# Patient Record
Sex: FEMALE | Race: OTHER | NOT HISPANIC OR LATINO | URBAN - METROPOLITAN AREA
[De-identification: names, ages, dates, MRNs, and addresses within clinical notes are randomized per-mention and may not be internally consistent; named-entity substitution may affect disease eponyms.]

---
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
## 2023-03-12 ENCOUNTER — INPATIENT (INPATIENT)
Facility: HOSPITAL | Age: 18
LOS: 3 days | Discharge: HOME | DRG: 27 | End: 2023-03-16
Attending: STUDENT IN AN ORGANIZED HEALTH CARE EDUCATION/TRAINING PROGRAM | Admitting: STUDENT IN AN ORGANIZED HEALTH CARE EDUCATION/TRAINING PROGRAM
Payer: COMMERCIAL

## 2023-03-12 VITALS
RESPIRATION RATE: 18 BRPM | HEART RATE: 104 BPM | DIASTOLIC BLOOD PRESSURE: 81 MMHG | TEMPERATURE: 99 F | OXYGEN SATURATION: 98 % | SYSTOLIC BLOOD PRESSURE: 127 MMHG

## 2023-03-12 LAB
A1C WITH ESTIMATED AVERAGE GLUCOSE RESULT: 5.4 % — SIGNIFICANT CHANGE UP (ref 4–5.6)
ALBUMIN SERPL ELPH-MCNC: 3.8 G/DL — SIGNIFICANT CHANGE UP (ref 3.3–5)
ALP SERPL-CCNC: 82 U/L — SIGNIFICANT CHANGE UP (ref 40–120)
ALT FLD-CCNC: 9 U/L — LOW (ref 10–45)
ANION GAP SERPL CALC-SCNC: 10 MMOL/L — SIGNIFICANT CHANGE UP (ref 5–17)
AST SERPL-CCNC: 17 U/L — SIGNIFICANT CHANGE UP (ref 10–40)
BILIRUB SERPL-MCNC: 0.6 MG/DL — SIGNIFICANT CHANGE UP (ref 0.2–1.2)
BUN SERPL-MCNC: 10 MG/DL — SIGNIFICANT CHANGE UP (ref 7–23)
CALCIUM SERPL-MCNC: 9.4 MG/DL — SIGNIFICANT CHANGE UP (ref 8.4–10.5)
CHLORIDE SERPL-SCNC: 106 MMOL/L — SIGNIFICANT CHANGE UP (ref 96–108)
CO2 SERPL-SCNC: 24 MMOL/L — SIGNIFICANT CHANGE UP (ref 22–31)
CREAT SERPL-MCNC: 0.7 MG/DL — SIGNIFICANT CHANGE UP (ref 0.5–1.3)
EGFR: 128 ML/MIN/1.73M2 — SIGNIFICANT CHANGE UP
ESTIMATED AVERAGE GLUCOSE: 108 MG/DL — SIGNIFICANT CHANGE UP (ref 68–114)
GLUCOSE SERPL-MCNC: 103 MG/DL — HIGH (ref 70–99)
HCG SERPL-ACNC: <0 MIU/ML — SIGNIFICANT CHANGE UP
HCT VFR BLD CALC: 31.2 % — LOW (ref 34.5–45)
HGB BLD-MCNC: 9.5 G/DL — LOW (ref 11.5–15.5)
MAGNESIUM SERPL-MCNC: 1.8 MG/DL — SIGNIFICANT CHANGE UP (ref 1.6–2.6)
MCHC RBC-ENTMCNC: 23.2 PG — LOW (ref 27–34)
MCHC RBC-ENTMCNC: 30.4 GM/DL — LOW (ref 32–36)
MCV RBC AUTO: 76.3 FL — LOW (ref 80–100)
NRBC # BLD: 0 /100 WBCS — SIGNIFICANT CHANGE UP (ref 0–0)
PHOSPHATE SERPL-MCNC: 4 MG/DL — SIGNIFICANT CHANGE UP (ref 2.5–4.5)
PLATELET # BLD AUTO: 356 K/UL — SIGNIFICANT CHANGE UP (ref 150–400)
POTASSIUM SERPL-MCNC: 4 MMOL/L — SIGNIFICANT CHANGE UP (ref 3.5–5.3)
POTASSIUM SERPL-SCNC: 4 MMOL/L — SIGNIFICANT CHANGE UP (ref 3.5–5.3)
PROT SERPL-MCNC: 7.1 G/DL — SIGNIFICANT CHANGE UP (ref 6–8.3)
RBC # BLD: 4.09 M/UL — SIGNIFICANT CHANGE UP (ref 3.8–5.2)
RBC # FLD: 15 % — HIGH (ref 10.3–14.5)
SARS-COV-2 RNA SPEC QL NAA+PROBE: SIGNIFICANT CHANGE UP
SODIUM SERPL-SCNC: 140 MMOL/L — SIGNIFICANT CHANGE UP (ref 135–145)
WBC # BLD: 6.3 K/UL — SIGNIFICANT CHANGE UP (ref 3.8–10.5)
WBC # FLD AUTO: 6.3 K/UL — SIGNIFICANT CHANGE UP (ref 3.8–10.5)

## 2023-03-12 PROCEDURE — 99291 CRITICAL CARE FIRST HOUR: CPT

## 2023-03-12 RX ORDER — INSULIN LISPRO 100/ML
VIAL (ML) SUBCUTANEOUS
Refills: 0 | Status: DISCONTINUED | OUTPATIENT
Start: 2023-03-12 | End: 2023-03-13

## 2023-03-12 RX ORDER — GLUCAGON INJECTION, SOLUTION 0.5 MG/.1ML
1 INJECTION, SOLUTION SUBCUTANEOUS ONCE
Refills: 0 | Status: DISCONTINUED | OUTPATIENT
Start: 2023-03-12 | End: 2023-03-13

## 2023-03-12 RX ORDER — SODIUM CHLORIDE 9 MG/ML
1000 INJECTION, SOLUTION INTRAVENOUS
Refills: 0 | Status: DISCONTINUED | OUTPATIENT
Start: 2023-03-12 | End: 2023-03-13

## 2023-03-12 RX ORDER — ALBUTEROL 90 UG/1
2 AEROSOL, METERED ORAL DAILY
Refills: 0 | Status: DISCONTINUED | OUTPATIENT
Start: 2023-03-12 | End: 2023-03-13

## 2023-03-12 RX ORDER — ACETAMINOPHEN 500 MG
650 TABLET ORAL EVERY 6 HOURS
Refills: 0 | Status: DISCONTINUED | OUTPATIENT
Start: 2023-03-12 | End: 2023-03-15

## 2023-03-12 RX ORDER — DEXTROSE 50 % IN WATER 50 %
25 SYRINGE (ML) INTRAVENOUS ONCE
Refills: 0 | Status: DISCONTINUED | OUTPATIENT
Start: 2023-03-12 | End: 2023-03-13

## 2023-03-12 RX ORDER — TOPIRAMATE 25 MG
50 TABLET ORAL DAILY
Refills: 0 | Status: DISCONTINUED | OUTPATIENT
Start: 2023-03-12 | End: 2023-03-16

## 2023-03-12 RX ORDER — SENNA PLUS 8.6 MG/1
2 TABLET ORAL AT BEDTIME
Refills: 0 | Status: DISCONTINUED | OUTPATIENT
Start: 2023-03-12 | End: 2023-03-14

## 2023-03-12 RX ORDER — DEXTROSE 50 % IN WATER 50 %
12.5 SYRINGE (ML) INTRAVENOUS ONCE
Refills: 0 | Status: DISCONTINUED | OUTPATIENT
Start: 2023-03-12 | End: 2023-03-13

## 2023-03-12 RX ORDER — DEXTROSE 50 % IN WATER 50 %
15 SYRINGE (ML) INTRAVENOUS ONCE
Refills: 0 | Status: DISCONTINUED | OUTPATIENT
Start: 2023-03-12 | End: 2023-03-13

## 2023-03-12 RX ADMIN — SENNA PLUS 2 TABLET(S): 8.6 TABLET ORAL at 22:18

## 2023-03-12 NOTE — PROGRESS NOTE ADULT - SUBJECTIVE AND OBJECTIVE BOX
HPI:    SURGERY:       EVENTS:         ICU Vital Signs Last 24 Hrs  T(C): --  T(F): --  HR: --  BP: --  BP(mean): --  ABP: --  ABP(mean): --  RR: --  SpO2: --                       PHYSICAL EXAM:    General: No Acute Distress     Neurological: Awake, alert oriented to person, place and time, Following Commands, PERRL, EOMI, Face Symmetrical, Speech Fluent, Moving all extremities, Muscle Strength normal in all four extremities, No Drift, Sensation to Light Touch Intact    Pulmonary: Clear to Auscultation, No Rales, No Rhonchi, No Wheezes     Cardiovascular: S1, S2, Regular Rate and Rhythm     Gastrointestinal: Soft, Nontender, Nondistended     Extremities: No calf tenderness     Incision:       MEDICATIONS:  Antibiotics:      Neurological:     Cardiac:     Pulm:    Heme:     Other:        DEVICES: [] Restraints [] MEERA/HMV []LD [] ET tube [] Trach [] Chest Tube [] A-line [] Ji [] NGT [] Rectal Tube       A/P:  hydrocephalus   Neuro: MRI brain wwo contrast  hydro watch   Respiratory: RA   CV:SBP goal 100-160 mmhg   Endocrine: sugar 120-180   DVT ppx: hold chemorphylaxis for possible EVD    Renal: NS 75 ml/hr   ID:   GI:   Social/Family:   Discharge planning:     Code Status: [] Full Code [] DNR [] DNI [] Goals of Care:   Disposition: [] ICU [] Stroke Unit [] RCU []PCU []Floor [] Discharge Home     Patient at high risk for neurologic deterioration, critical care time, excluding procedures: 45 minutes                    HPI:  16 yo woman with PMH of migraine is presenting for episode of syncope, and CT head showing hydrocephalus.   She has had multiple episodes of syncope within the last few years, had MRI brain 3 years ago which showed hydrocepahlus, she was advised to see a NS but did not. Per patient  She had EEG monitoring which did not show seizures and she saw a cardiologist who told her that there is no problem with her heart.  She doesnt remember her syncope episodes and she denies urinating on herself or biting her tongue The syncope has been unwitnessed, not sure how long it lasts. denies being post-ictal afterwards        T(C): 37.1 (03-12-23 @ 20:06), Max: 37.1 (03-12-23 @ 20:06)  HR: 88 (03-12-23 @ 21:10) (88 - 104)  BP: 108/66 (03-12-23 @ 21:10) (108/66 - 127/81)  RR: 17 (03-12-23 @ 21:10) (17 - 18)  SpO2: 97% (03-12-23 @ 21:10) (97% - 98%)  03-12-23 @ 07:01  -  03-12-23 @ 21:27  --------------------------------------------------------  IN: 0 mL / OUT: 200 mL / NET: -200 mL    acetaminophen     Tablet .. 650 milliGRAM(s) Oral every 6 hours PRN  albuterol    90 MICROgram(s) HFA Inhaler 2 Puff(s) Inhalation daily  senna 2 Tablet(s) Oral at bedtime  topiramate 50 milliGRAM(s) Oral daily                PHYSICAL EXAM:    General: No Acute Distress     Neurological: Awake, alert oriented to person, place and time, Following Commands, PERRL, EOMI, Face Symmetrical, Speech Fluent, Moving all extremities, Muscle Strength normal in all four extremities, No Drift, Sensation to Light Touch Intact    Pulmonary: Clear to Auscultation, No Rales, No Rhonchi, No Wheezes     Cardiovascular: S1, S2, Regular Rate and Rhythm     Gastrointestinal: Soft, Nontender, Nondistended     Extremities: No calf tenderness     Incision:       MEDICATIONS:  Antibiotics:      Neurological:     Cardiac:     Pulm:    Heme:     Other:        DEVICES: [] Restraints [] MEERA/HMV []LD [] ET tube [] Trach [] Chest Tube [] A-line [] Ji [] NGT [] Rectal Tube       A/P:  hydrocephalus   Neuro: MRI brain wwo contrast  symptomatic hydro watch might need an EVD   migraine continue home topamax   denies neck pain, and no tenderness on exam   Respiratory: RA   CV:SBP goal 100-160 mmhg   Endocrine: sugar 120-180   DVT ppx: hold chemorphylaxis for possible EVD    Renal: IVL   ID: afebrile   GI: regular diet   Social/Family: updated at bedside   Discharge planning:     Code Status: [x] Full Code [] DNR [] DNI [] Goals of Care:   Disposition: [x] ICU [] Stroke Unit [] RCU []PCU []Floor [] Discharge Home     Patient at high risk for neurologic deterioration, critical care time, excluding procedures: 30 minutes                    no

## 2023-03-13 ENCOUNTER — TRANSCRIPTION ENCOUNTER (OUTPATIENT)
Age: 18
End: 2023-03-13

## 2023-03-13 DIAGNOSIS — G91.1 OBSTRUCTIVE HYDROCEPHALUS: ICD-10-CM

## 2023-03-13 LAB
ANION GAP SERPL CALC-SCNC: 11 MMOL/L — SIGNIFICANT CHANGE UP (ref 5–17)
BUN SERPL-MCNC: 12 MG/DL — SIGNIFICANT CHANGE UP (ref 7–23)
CALCIUM SERPL-MCNC: 9.6 MG/DL — SIGNIFICANT CHANGE UP (ref 8.4–10.5)
CHLORIDE SERPL-SCNC: 104 MMOL/L — SIGNIFICANT CHANGE UP (ref 96–108)
CO2 SERPL-SCNC: 25 MMOL/L — SIGNIFICANT CHANGE UP (ref 22–31)
CREAT SERPL-MCNC: 0.77 MG/DL — SIGNIFICANT CHANGE UP (ref 0.5–1.3)
EGFR: 114 ML/MIN/1.73M2 — SIGNIFICANT CHANGE UP
GLUCOSE BLDC GLUCOMTR-MCNC: 99 MG/DL — SIGNIFICANT CHANGE UP (ref 70–99)
GLUCOSE SERPL-MCNC: 101 MG/DL — HIGH (ref 70–99)
HCT VFR BLD CALC: 33.9 % — LOW (ref 34.5–45)
HGB BLD-MCNC: 10.3 G/DL — LOW (ref 11.5–15.5)
MAGNESIUM SERPL-MCNC: 1.9 MG/DL — SIGNIFICANT CHANGE UP (ref 1.6–2.6)
MCHC RBC-ENTMCNC: 23 PG — LOW (ref 27–34)
MCHC RBC-ENTMCNC: 30.4 GM/DL — LOW (ref 32–36)
MCV RBC AUTO: 75.7 FL — LOW (ref 80–100)
NRBC # BLD: 0 /100 WBCS — SIGNIFICANT CHANGE UP (ref 0–0)
PHOSPHATE SERPL-MCNC: 4.5 MG/DL — SIGNIFICANT CHANGE UP (ref 2.5–4.5)
PLATELET # BLD AUTO: 357 K/UL — SIGNIFICANT CHANGE UP (ref 150–400)
POTASSIUM SERPL-MCNC: 4.3 MMOL/L — SIGNIFICANT CHANGE UP (ref 3.5–5.3)
POTASSIUM SERPL-SCNC: 4.3 MMOL/L — SIGNIFICANT CHANGE UP (ref 3.5–5.3)
RBC # BLD: 4.48 M/UL — SIGNIFICANT CHANGE UP (ref 3.8–5.2)
RBC # FLD: 15.2 % — HIGH (ref 10.3–14.5)
SODIUM SERPL-SCNC: 140 MMOL/L — SIGNIFICANT CHANGE UP (ref 135–145)
WBC # BLD: 4.86 K/UL — SIGNIFICANT CHANGE UP (ref 3.8–10.5)
WBC # FLD AUTO: 4.86 K/UL — SIGNIFICANT CHANGE UP (ref 3.8–10.5)

## 2023-03-13 PROCEDURE — 70544 MR ANGIOGRAPHY HEAD W/O DYE: CPT | Mod: 26,59

## 2023-03-13 PROCEDURE — 70553 MRI BRAIN STEM W/O & W/DYE: CPT | Mod: 26

## 2023-03-13 PROCEDURE — 99291 CRITICAL CARE FIRST HOUR: CPT

## 2023-03-13 PROCEDURE — 99221 1ST HOSP IP/OBS SF/LOW 40: CPT

## 2023-03-13 RX ORDER — ENOXAPARIN SODIUM 100 MG/ML
40 INJECTION SUBCUTANEOUS ONCE
Refills: 0 | Status: COMPLETED | OUTPATIENT
Start: 2023-03-13 | End: 2023-03-13

## 2023-03-13 RX ORDER — ONDANSETRON 8 MG/1
4 TABLET, FILM COATED ORAL ONCE
Refills: 0 | Status: COMPLETED | OUTPATIENT
Start: 2023-03-13 | End: 2023-03-13

## 2023-03-13 RX ORDER — INFLUENZA VIRUS VACCINE 15; 15; 15; 15 UG/.5ML; UG/.5ML; UG/.5ML; UG/.5ML
0.5 SUSPENSION INTRAMUSCULAR ONCE
Refills: 0 | Status: DISCONTINUED | OUTPATIENT
Start: 2023-03-13 | End: 2023-03-16

## 2023-03-13 RX ORDER — ALBUTEROL 90 UG/1
3 AEROSOL, METERED ORAL
Qty: 0 | Refills: 0 | DISCHARGE

## 2023-03-13 RX ORDER — TOPIRAMATE 25 MG
1 TABLET ORAL
Qty: 0 | Refills: 0 | DISCHARGE

## 2023-03-13 RX ORDER — ALBUTEROL 90 UG/1
2 AEROSOL, METERED ORAL DAILY
Refills: 0 | Status: DISCONTINUED | OUTPATIENT
Start: 2023-03-13 | End: 2023-03-16

## 2023-03-13 RX ADMIN — ENOXAPARIN SODIUM 40 MILLIGRAM(S): 100 INJECTION SUBCUTANEOUS at 11:14

## 2023-03-13 RX ADMIN — Medication 2 MILLIGRAM(S): at 12:15

## 2023-03-13 RX ADMIN — Medication 50 MILLIGRAM(S): at 11:14

## 2023-03-13 RX ADMIN — ONDANSETRON 4 MILLIGRAM(S): 8 TABLET, FILM COATED ORAL at 14:15

## 2023-03-13 RX ADMIN — SENNA PLUS 2 TABLET(S): 8.6 TABLET ORAL at 21:44

## 2023-03-13 NOTE — DIETITIAN INITIAL EVALUATION ADULT - PERTINENT LABORATORY DATA
03-13    140  |  104  |  12  ----------------------------<  101<H>  4.3   |  25  |  0.77    Ca    9.6      13 Mar 2023 05:30  Phos  4.5     03-13  Mg     1.9     03-13    TPro  7.1  /  Alb  3.8  /  TBili  0.6  /  DBili  x   /  AST  17  /  ALT  9<L>  /  AlkPhos  82  03-12  POCT Blood Glucose.: 99 mg/dL (03-13-23 @ 06:34)  A1C with Estimated Average Glucose Result: 5.4 % (03-12-23 @ 21:40)

## 2023-03-13 NOTE — H&P ADULT - ASSESSMENT
17F hx of migraines, asthma, several years of syncopal episodes presented to [facility] on 3/12/23 for a syncopal event. Patient states she was in her kitchen getting ready for work and suddenly felt lightheaded and had cold sweats. She states it was an unwitnessed fall and landed on her face chipping her front tooth. She was brought to [place] and underwent CTH showing moderate obstructive hydrocephalus. She states she had a similar syncopal episode most recently last year and another 3 years ago. She states she was told to see a neurosurgeon for "increased fluid" after an outpatient MRI brain was done but did not follow-up, at this time she also was seen by a cardiologist for syncope work-up that was reportedly negative. She denies any symptoms currently and is accompanied by her father.

## 2023-03-13 NOTE — H&P ADULT - HISTORY OF PRESENT ILLNESS
17F hx of migraines, asthma, several years of syncopal episodes presented to [facility] on 3/12/23 for a syncopal event. Patient states she was in her kitchen getting ready for work and suddenly felt lightheaded and had cold sweats. She states it was an unwitnessed fall and landed on her face chipping her front tooth. She was brought to [place] and underwent CTH showing moderate obstructive hydrocephalus and CT orbits/facial bones which was negative. She states she had a similar syncopal episode most recently last year and another 3 years ago. She states she was told to see a neurosurgeon for "increased fluid" after an outpatient MRI brain was done but did not follow-up, at this time she also was seen by a cardiologist for syncope work-up that was reportedly negative. She denies any symptoms currently and is accompanied by her father.

## 2023-03-13 NOTE — H&P ADULT - NSHPPHYSICALEXAM_GEN_ALL_CORE
Constitutional: NAD, well groomed, well nourished  Respiratory: breathing non-labored, symmetrical chest wall movement  Cardiovascuar: RRR, no murmurs  Gastrointestinal: abdomen soft, non tender  Genitourinary: exam deffered  Neurological:  AAOX3. Face symmetric, Verbal function intact, speech clear  Cranial Nerves: II-XII intact  Motor: 5/5 power in b/l upper extremities and lower extremities  Sensation: intact to light touch in all extremities  Pronator Drift: negative  Dysmetria: negative  Extremities: distal pulses 2+ x4

## 2023-03-13 NOTE — DIETITIAN INITIAL EVALUATION ADULT - PERTINENT MEDS FT
MEDICATIONS  (STANDING):  influenza   Vaccine 0.5 milliLiter(s) IntraMuscular once  LORazepam   Injectable 2 milliGRAM(s) IV Push once  senna 2 Tablet(s) Oral at bedtime  topiramate 50 milliGRAM(s) Oral daily    MEDICATIONS  (PRN):  acetaminophen     Tablet .. 650 milliGRAM(s) Oral every 6 hours PRN Temp greater or equal to 38C (100.4F), Mild Pain (1 - 3)  albuterol    90 MICROgram(s) HFA Inhaler 2 Puff(s) Inhalation daily PRN Shortness of Breath and/or Wheezing

## 2023-03-13 NOTE — DIETITIAN INITIAL EVALUATION ADULT - OTHER INFO
17F hx of migraines, asthma, several years of syncopal episodes presented to [facility] on 3/12/23 for a syncopal event. Patient states she was in her kitchen getting ready for work and suddenly felt lightheaded and had cold sweats. She states it was an unwitnessed fall and landed on her face chipping her front tooth. She was brought to [place] and underwent CTH showing moderate obstructive hydrocephalus. She states she had a similar syncopal episode most recently last year and another 3 years ago. She states she was told to see a neurosurgeon for "increased fluid" after an outpatient MRI brain was done but did not follow-up, at this time she also was seen by a cardiologist for syncope work-up that was reportedly negative.     On assessment, pt resting in bed. Currently on regular diet tolerating PO well. Pt consuming >50% of meals. No n/v/d/c. No abd distention or discomfort. Skin WDL, dannielle score 19. No pain noted at this time. Pt reported good appetite PTA without noted changes. Follows regular diet. No cultural, ethnic, Buddhist food preferences noted. UBW consistent with admission weight. NKFA nor intolerances noted. RD cont to encourage adequate PO intake as tolerated. RD to follow.

## 2023-03-13 NOTE — PATIENT PROFILE ADULT - FALL HARM RISK - HARM RISK INTERVENTIONS
Communicate Risk of Fall with Harm to all staff/Reinforce activity limits and safety measures with patient and family/Tailored Fall Risk Interventions/Visual Cue: Yellow wristband and red socks/Bed in lowest position, wheels locked, appropriate side rails in place/Call bell, personal items and telephone in reach/Instruct patient to call for assistance before getting out of bed or chair/Non-slip footwear when patient is out of bed/[place] to call system/Physically safe environment - no spills, clutter or unnecessary equipment/Purposeful Proactive Rounding/Room/bathroom lighting operational, light cord in reach Assistance with ambulation/Assistance OOB with selected safe patient handling equipment/Communicate Risk of Fall with Harm to all staff/Reinforce activity limits and safety measures with patient and family/Tailored Fall Risk Interventions/Visual Cue: Yellow wristband and red socks/Bed in lowest position, wheels locked, appropriate side rails in place/Call bell, personal items and telephone in reach/Instruct patient to call for assistance before getting out of bed or chair/Non-slip footwear when patient is out of bed/[place] to call system/Physically safe environment - no spills, clutter or unnecessary equipment/Purposeful Proactive Rounding/Room/bathroom lighting operational, light cord in reach

## 2023-03-13 NOTE — PROGRESS NOTE ADULT - SUBJECTIVE AND OBJECTIVE BOX
NSCU ATTENDING -- ADDITIONAL PROGRESS NOTE    Nighttime rounds were performed -- please refer to earlier Progress Note for HPI details.      ICU Vital Signs Last 24 Hrs  T(C): 37 (13 Mar 2023 17:22), Max: 37 (13 Mar 2023 17:22)  T(F): 98.6 (13 Mar 2023 17:22), Max: 98.6 (13 Mar 2023 17:22)  HR: 100 (13 Mar 2023 21:00) (68 - 114)  BP: 111/65 (13 Mar 2023 21:00) (91/56 - 119/86)  BP(mean): 82 (13 Mar 2023 21:00) (68 - 99)  RR: 16 (13 Mar 2023 21:00) (15 - 20)  SpO2: 98% (13 Mar 2023 21:00) (95% - 99%)      03-12-23 @ 07:01  -  03-13-23 @ 07:00  --------------------------------------------------------  IN: 150 mL / OUT: 475 mL / NET: -325 mL    03-13-23 @ 07:01  -  03-13-23 @ 21:33  --------------------------------------------------------  IN: 600 mL / OUT: 950 mL / NET: -350 mL      NEURO EXAM:  Neurological: Awake, alert oriented to person, place and time, Following Commands, PERRL, EOMI, Face Symmetrical, Speech Fluent,   Moving all extremities, Muscle Strength normal in all four extremities, No Drift, Sensation to Light Touch Intact      MEDICATIONS:   acetaminophen     Tablet .. 650 milliGRAM(s) Oral every 6 hours PRN  albuterol    90 MICROgram(s) HFA Inhaler 2 Puff(s) Inhalation daily PRN  influenza   Vaccine 0.5 milliLiter(s) IntraMuscular once  senna 2 Tablet(s) Oral at bedtime  topiramate 50 milliGRAM(s) Oral daily      LABS:                      10.3   4.86  )-----------( 357      ( 13 Mar 2023 05:30 )             33.9     03-13    140  |  104  |  12  ----------------------------<  101<H>  4.3   |  25  |  0.77    Ca    9.6      13 Mar 2023 05:30  Phos  4.5     03-13  Mg     1.9     03-13    TPro  7.1  /  Alb  3.8  /  TBili  0.6  /  DBili  x   /  AST  17  /  ALT  9<L>  /  AlkPhos  82  03-12    LIVER FUNCTIONS - ( 12 Mar 2023 21:40 )  Alb: 3.8 g/dL / Pro: 7.1 g/dL / ALK PHOS: 82 U/L / ALT: 9 U/L / AST: 17 U/L / GGT: x             ASSESSMENT: Hydrocephalus   Neuro: Q4 neurochecks  MRI reviewed. Aqueductal stenosis with transependymal flow  Migraine: Continue home topamax   Respiratory: Room air  CV: SBP goal 100-160 mmhg   Endocrine: sugar 120-180   DVT ppx: SCDs. Start SQL pending OR  Microcytic anemia- send anemia w/u  Renal: IVL   ID: Afebrile   GI: Regular diet

## 2023-03-13 NOTE — PROGRESS NOTE ADULT - SUBJECTIVE AND OBJECTIVE BOX
***********************************************  ADULT NSICU PROGRESS NOTE  CLAIRE RICHARDS [id number] [facility] [address]EA [id number]  ***********************************************    24H INTERVAL EVENTS:    ROS: negative except per mentioned above in 24h interval events.      HOSPITAL COURSE CARRIED FORWARD:    VITALS:    ICU Vital Signs Last 24 Hrs  T(C): 36.3 (13 Mar 2023 05:15), Max: 37.1 (12 Mar 2023 20:06)  T(F): 97.4 (13 Mar 2023 05:15), Max: 98.7 (12 Mar 2023 20:06)  HR: 73 (13 Mar 2023 07:00) (68 - 104)  BP: 91/56 (13 Mar 2023 07:00) (91/56 - 127/81)  BP(mean): 68 (13 Mar 2023 07:00) (68 - 96)  ABP: --  ABP(mean): --  RR: 15 (13 Mar 2023 07:00) (15 - 18)  SpO2: 97% (13 Mar 2023 07:00) (96% - 99%)    O2 Parameters below as of 13 Mar 2023 07:00  Patient On (Oxygen Delivery Method): room air                I&O's Summary    12 Mar 2023 07:01  -  13 Mar 2023 07:00  --------------------------------------------------------  IN: 150 mL / OUT: 475 mL / NET: -325 mL        EXAM:     [place] Coma Scale:     General: normocephalic, atraumatic, laying in bed, in no distress  Neuro     MS: A/Ox3, cooperative, normal attention, no neglect, comprehension intact, speech with preserved fluency, naming, and repetition    CN: PERRL, VF FTC, EOMI and no ptosis bilaterally, sensation intact to crude touch V1-V3, face symmetric, hearing grossly intact    Mot: bulk normal, tone normal, power 5/5 in bilateral upper and lower proximal extremities    Sens: intact to crude touch in bilateral upper and lower extremities    Reflexes: deferred    Coord: no dysmetria or ataxia on finger to nose/heel to shin, respectively, no focal bradykinetic movements    Gait: deferred  Chest: nonlabored respirations, no adventitious lung sounds bilaterally, heart regular rate/rhythm, present S1/S2, no murmurs or rubs  Abdomen: nondistended, soft and nontender without peritoneal signs, normoactive bowel sounds  Extremities: no clubbing, well-perfused, no edema    LABORATORY DATA:                            10.3   4.86  )-----------( 357      ( 13 Mar 2023 05:30 )             33.9     03-13    140  |  104  |  12  ----------------------------<  101<H>  4.3   |  25  |  0.77    Ca    9.6      13 Mar 2023 05:30  Phos  4.5     03-13  Mg     1.9     03-13    TPro  7.1  /  Alb  3.8  /  TBili  0.6  /  DBili  x   /  AST  17  /  ALT  9<L>  /  AlkPhos  82  03-12    LIVER FUNCTIONS - ( 12 Mar 2023 21:40 )  Alb: 3.8 g/dL / Pro: 7.1 g/dL / ALK PHOS: 82 U/L / ALT: 9 U/L / AST: 17 U/L / GGT: x               IMAGING DATA:    CARDIOLOGY DATA:    MICROBIOLOGY DATA:        MEDICATIONS  (STANDING):  insulin lispro (ADMELOG) corrective regimen sliding scale   SubCutaneous three times a day before meals  senna 2 Tablet(s) Oral at bedtime  topiramate 50 milliGRAM(s) Oral daily    MEDICATIONS  (PRN):  acetaminophen     Tablet .. 650 milliGRAM(s) Oral every 6 hours PRN Temp greater or equal to 38C (100.4F), Mild Pain (1 - 3)  albuterol    90 MICROgram(s) HFA Inhaler 2 Puff(s) Inhalation daily PRN Shortness of Breath and/or Wheezing      ***********************************************  ASSESSMENT AND PLAN  ***********************************************    This is a case of ***    NEURO  - Admit NSICU, Q1h neuro checks / Q1h vital signs    PULM  - SpO2 goal > 92%, supplemental O2 and pulm toileting as needed    CARDIO  - BP goal:     GI  - Diet:   - Stress ulcer prophylaxis:     /RENAL  - Monitor UOP/volume status, BUN/SCr    HEME  - Maintain Hb > 7.0, PLT > ***    ID  - Monitor for infectious s/s, fever curve, leukocytosis    ENDO    03-13-23 @ 07:28       ***********************************************  ADULT NSICU PROGRESS NOTE  CLAIRE RICHARDS [id number] [facility] [address]EA [id number]  ***********************************************    24H INTERVAL EVENTS:  - Admit overnight for findings c/f hydrocephalus    ROS: negative except per mentioned above in 24h interval events.      VITALS:    ICU Vital Signs Last 24 Hrs  T(C): 36.3 (13 Mar 2023 05:15), Max: 37.1 (12 Mar 2023 20:06)  T(F): 97.4 (13 Mar 2023 05:15), Max: 98.7 (12 Mar 2023 20:06)  HR: 73 (13 Mar 2023 07:00) (68 - 104)  BP: 91/56 (13 Mar 2023 07:00) (91/56 - 127/81)  BP(mean): 68 (13 Mar 2023 07:00) (68 - 96)  ABP: --  ABP(mean): --  RR: 15 (13 Mar 2023 07:00) (15 - 18)  SpO2: 97% (13 Mar 2023 07:00) (96% - 99%)    O2 Parameters below as of 13 Mar 2023 07:00  Patient On (Oxygen Delivery Method): room air                I&O's Summary    12 Mar 2023 07:01  -  13 Mar 2023 07:00  --------------------------------------------------------  IN: 150 mL / OUT: 475 mL / NET: -325 mL        EXAM:     [place] Coma Scale: 15    General: normocephalic, atraumatic, laying in bed, in no distress  Neuro     MS: A/Ox3, cooperative, normal attention, no neglect, comprehension intact, speech with preserved fluency, naming, and repetition    CN: PERRL, VF FTC, EOMI and no ptosis bilaterally, sensation intact to crude touch V1-V3, face symmetric, hearing grossly intact    Mot: bulk normal, tone normal, power 5/5 in bilateral upper and lower proximal extremities    Sens: intact to crude touch in bilateral upper and lower extremities    Reflexes: deferred    Coord: no dysmetria or ataxia on finger to nose/heel to shin, respectively, no focal bradykinetic movements    Gait: deferred  Chest: nonlabored respirations, no adventitious lung sounds bilaterally, heart regular rate/rhythm, present S1/S2, no murmurs or rubs  Abdomen: nondistended, soft and nontender without peritoneal signs, normoactive bowel sounds  Extremities: no clubbing, well-perfused, no edema    LABORATORY DATA:                            10.3   4.86  )-----------( 357      ( 13 Mar 2023 05:30 )             33.9     03-13    140  |  104  |  12  ----------------------------<  101<H>  4.3   |  25  |  0.77    Ca    9.6      13 Mar 2023 05:30  Phos  4.5     03-13  Mg     1.9     03-13    TPro  7.1  /  Alb  3.8  /  TBili  0.6  /  DBili  x   /  AST  17  /  ALT  9<L>  /  AlkPhos  82  03-12    LIVER FUNCTIONS - ( 12 Mar 2023 21:40 )  Alb: 3.8 g/dL / Pro: 7.1 g/dL / ALK PHOS: 82 U/L / ALT: 9 U/L / AST: 17 U/L / GGT: x               IMAGING DATA:    CARDIOLOGY DATA:    MICROBIOLOGY DATA:        MEDICATIONS  (STANDING):  insulin lispro (ADMELOG) corrective regimen sliding scale   SubCutaneous three times a day before meals  senna 2 Tablet(s) Oral at bedtime  topiramate 50 milliGRAM(s) Oral daily    MEDICATIONS  (PRN):  acetaminophen     Tablet .. 650 milliGRAM(s) Oral every 6 hours PRN Temp greater or equal to 38C (100.4F), Mild Pain (1 - 3)  albuterol    90 MICROgram(s) HFA Inhaler 2 Puff(s) Inhalation daily PRN Shortness of Breath and/or Wheezing      ***********************************************  ASSESSMENT AND PLAN  ***********************************************      #Communicating hydrocephalus, ?aqueductal stenosis  #History of migraine headaches, prior syncopal episodes    NEURO - admit NSICU, Q4h neuro checks / Q4h vital signs, f/u final MRI w/wo aaron read, suggestive of aqueductal stenosis, PT/OT, pain control, home topamax  PULM - SpO2 goal > 92%, supplemental O2 and pulm toileting as needed  CARDIO - BP goal NORMOTENSION  GI - bowel regimen, stool count, diet: ADAT    /RENAL - monitor UOP/volume status, BUN/SCr  HEME - maintain Hb > 7.0, PLT > 100,000  ID - monitor for infectious s/s, fever curve, leukocytosis  ENDO - SGlu goal < 200    ICU stepdown Checklist:    [X] hemodynamically stable – VS WNL and stable x 24hours, UO adequate  [X] if  previously on HDA - off pressors x 24h with stable neuro exam   [X] no new symptoms x 24h (i.e. new fever, new-onset nausea/vomiting)  [X] stable labs: (i.e. WBC not rising, sodium not dropping)  [X] patient not at high risk for aspiration, if high risk then:                  [ ] should have definitive plans for trach/PEG (alternative option is to discharge from ICU to facilty)                  [ ] stepdown to bed close to nurse’s station  [X] low suctioning requirements (i.e. q4h or less)  [X] sign-off from primary RN*  [X] drains do not require ICU level of care  [X] if patient previously agitated or with behavioral issues – controlled  [X] pain controlled

## 2023-03-14 ENCOUNTER — TRANSCRIPTION ENCOUNTER (OUTPATIENT)
Age: 18
End: 2023-03-14

## 2023-03-14 LAB
ANION GAP SERPL CALC-SCNC: 9 MMOL/L — SIGNIFICANT CHANGE UP (ref 5–17)
ANION GAP SERPL CALC-SCNC: 9 MMOL/L — SIGNIFICANT CHANGE UP (ref 5–17)
APTT BLD: 29.1 SEC — SIGNIFICANT CHANGE UP (ref 27.5–35.5)
APTT BLD: 37.4 SEC — HIGH (ref 27.5–35.5)
BLD GP AB SCN SERPL QL: NEGATIVE — SIGNIFICANT CHANGE UP
BUN SERPL-MCNC: 12 MG/DL — SIGNIFICANT CHANGE UP (ref 7–23)
BUN SERPL-MCNC: 14 MG/DL — SIGNIFICANT CHANGE UP (ref 7–23)
CALCIUM SERPL-MCNC: 10 MG/DL — SIGNIFICANT CHANGE UP (ref 8.4–10.5)
CALCIUM SERPL-MCNC: 9.2 MG/DL — SIGNIFICANT CHANGE UP (ref 8.4–10.5)
CHLORIDE SERPL-SCNC: 104 MMOL/L — SIGNIFICANT CHANGE UP (ref 96–108)
CHLORIDE SERPL-SCNC: 106 MMOL/L — SIGNIFICANT CHANGE UP (ref 96–108)
CO2 SERPL-SCNC: 21 MMOL/L — LOW (ref 22–31)
CO2 SERPL-SCNC: 23 MMOL/L — SIGNIFICANT CHANGE UP (ref 22–31)
CREAT SERPL-MCNC: 0.8 MG/DL — SIGNIFICANT CHANGE UP (ref 0.5–1.3)
CREAT SERPL-MCNC: 0.95 MG/DL — SIGNIFICANT CHANGE UP (ref 0.5–1.3)
EGFR: 109 ML/MIN/1.73M2 — SIGNIFICANT CHANGE UP
EGFR: 89 ML/MIN/1.73M2 — SIGNIFICANT CHANGE UP
FERRITIN SERPL-MCNC: 5 NG/ML — LOW (ref 15–150)
GLUCOSE SERPL-MCNC: 100 MG/DL — HIGH (ref 70–99)
GLUCOSE SERPL-MCNC: 128 MG/DL — HIGH (ref 70–99)
HAPTOGLOB SERPL-MCNC: 141 MG/DL — SIGNIFICANT CHANGE UP (ref 34–200)
HCG SERPL-ACNC: <0 MIU/ML — SIGNIFICANT CHANGE UP
HCT VFR BLD CALC: 30.7 % — LOW (ref 34.5–45)
HCT VFR BLD CALC: 37.3 % — SIGNIFICANT CHANGE UP (ref 34.5–45)
HGB BLD-MCNC: 11.6 G/DL — SIGNIFICANT CHANGE UP (ref 11.5–15.5)
HGB BLD-MCNC: 9.6 G/DL — LOW (ref 11.5–15.5)
INR BLD: 1.09 — SIGNIFICANT CHANGE UP (ref 0.88–1.16)
INR BLD: 1.17 — HIGH (ref 0.88–1.16)
IRON SATN MFR SERPL: 25 UG/DL — LOW (ref 30–160)
IRON SATN MFR SERPL: 6 % — LOW (ref 14–50)
LDH SERPL L TO P-CCNC: 152 U/L — SIGNIFICANT CHANGE UP (ref 50–242)
MAGNESIUM SERPL-MCNC: 1.7 MG/DL — SIGNIFICANT CHANGE UP (ref 1.6–2.6)
MAGNESIUM SERPL-MCNC: 2 MG/DL — SIGNIFICANT CHANGE UP (ref 1.6–2.6)
MCHC RBC-ENTMCNC: 23.5 PG — LOW (ref 27–34)
MCHC RBC-ENTMCNC: 23.6 PG — LOW (ref 27–34)
MCHC RBC-ENTMCNC: 31.1 GM/DL — LOW (ref 32–36)
MCHC RBC-ENTMCNC: 31.3 GM/DL — LOW (ref 32–36)
MCV RBC AUTO: 75.2 FL — LOW (ref 80–100)
MCV RBC AUTO: 76 FL — LOW (ref 80–100)
NRBC # BLD: 0 /100 WBCS — SIGNIFICANT CHANGE UP (ref 0–0)
NRBC # BLD: 0 /100 WBCS — SIGNIFICANT CHANGE UP (ref 0–0)
PHOSPHATE SERPL-MCNC: 4.2 MG/DL — SIGNIFICANT CHANGE UP (ref 2.5–4.5)
PHOSPHATE SERPL-MCNC: 5.1 MG/DL — HIGH (ref 2.5–4.5)
PLATELET # BLD AUTO: 323 K/UL — SIGNIFICANT CHANGE UP (ref 150–400)
PLATELET # BLD AUTO: 358 K/UL — SIGNIFICANT CHANGE UP (ref 150–400)
POTASSIUM SERPL-MCNC: 4 MMOL/L — SIGNIFICANT CHANGE UP (ref 3.5–5.3)
POTASSIUM SERPL-MCNC: 4.2 MMOL/L — SIGNIFICANT CHANGE UP (ref 3.5–5.3)
POTASSIUM SERPL-SCNC: 4 MMOL/L — SIGNIFICANT CHANGE UP (ref 3.5–5.3)
POTASSIUM SERPL-SCNC: 4.2 MMOL/L — SIGNIFICANT CHANGE UP (ref 3.5–5.3)
PROTHROM AB SERPL-ACNC: 13 SEC — SIGNIFICANT CHANGE UP (ref 10.5–13.4)
PROTHROM AB SERPL-ACNC: 13.9 SEC — HIGH (ref 10.5–13.4)
RBC # BLD: 4.08 M/UL — SIGNIFICANT CHANGE UP (ref 3.8–5.2)
RBC # BLD: 4.91 M/UL — SIGNIFICANT CHANGE UP (ref 3.8–5.2)
RBC # BLD: 4.91 M/UL — SIGNIFICANT CHANGE UP (ref 3.8–5.2)
RBC # FLD: 15.1 % — HIGH (ref 10.3–14.5)
RBC # FLD: 15.5 % — HIGH (ref 10.3–14.5)
RETICS #: 45.7 K/UL — SIGNIFICANT CHANGE UP (ref 25–125)
RETICS/RBC NFR: 0.9 % — SIGNIFICANT CHANGE UP (ref 0.5–2.5)
RH IG SCN BLD-IMP: POSITIVE — SIGNIFICANT CHANGE UP
SODIUM SERPL-SCNC: 136 MMOL/L — SIGNIFICANT CHANGE UP (ref 135–145)
SODIUM SERPL-SCNC: 136 MMOL/L — SIGNIFICANT CHANGE UP (ref 135–145)
TIBC SERPL-MCNC: 399 UG/DL — SIGNIFICANT CHANGE UP (ref 220–430)
TRANSFERRIN SERPL-MCNC: 349 MG/DL — SIGNIFICANT CHANGE UP (ref 200–360)
UIBC SERPL-MCNC: 374 UG/DL — HIGH (ref 110–370)
WBC # BLD: 6.03 K/UL — SIGNIFICANT CHANGE UP (ref 3.8–10.5)
WBC # BLD: 6.86 K/UL — SIGNIFICANT CHANGE UP (ref 3.8–10.5)
WBC # FLD AUTO: 6.03 K/UL — SIGNIFICANT CHANGE UP (ref 3.8–10.5)
WBC # FLD AUTO: 6.86 K/UL — SIGNIFICANT CHANGE UP (ref 3.8–10.5)

## 2023-03-14 PROCEDURE — 99233 SBSQ HOSP IP/OBS HIGH 50: CPT

## 2023-03-14 PROCEDURE — 15750 NEUROVASCULAR PEDICLE FLAP: CPT

## 2023-03-14 PROCEDURE — 62201 BRAIN CAVITY SHUNT W/SCOPE: CPT

## 2023-03-14 DEVICE — SURGIFLO HEMOSTATIC MATRIX KIT: Type: IMPLANTABLE DEVICE | Site: RIGHT | Status: FUNCTIONAL

## 2023-03-14 DEVICE — MAYFIELD SKULL PIN ADULT PLASTIC: Type: IMPLANTABLE DEVICE | Site: RIGHT | Status: FUNCTIONAL

## 2023-03-14 DEVICE — IMPLANTABLE DEVICE: Type: IMPLANTABLE DEVICE | Site: RIGHT | Status: FUNCTIONAL

## 2023-03-14 DEVICE — CATH EVD BACTISEAL: Type: IMPLANTABLE DEVICE | Site: RIGHT | Status: FUNCTIONAL

## 2023-03-14 RX ORDER — SODIUM CHLORIDE 9 MG/ML
1000 INJECTION INTRAMUSCULAR; INTRAVENOUS; SUBCUTANEOUS
Refills: 0 | Status: DISCONTINUED | OUTPATIENT
Start: 2023-03-14 | End: 2023-03-14

## 2023-03-14 RX ORDER — DEXAMETHASONE 0.5 MG/5ML
4 ELIXIR ORAL EVERY 6 HOURS
Refills: 0 | Status: DISCONTINUED | OUTPATIENT
Start: 2023-03-14 | End: 2023-03-14

## 2023-03-14 RX ORDER — ONDANSETRON 8 MG/1
4 TABLET, FILM COATED ORAL EVERY 6 HOURS
Refills: 0 | Status: DISCONTINUED | OUTPATIENT
Start: 2023-03-14 | End: 2023-03-15

## 2023-03-14 RX ORDER — POVIDONE-IODINE 5 %
1 AEROSOL (ML) TOPICAL ONCE
Refills: 0 | Status: COMPLETED | OUTPATIENT
Start: 2023-03-14 | End: 2023-03-14

## 2023-03-14 RX ORDER — OXYCODONE HYDROCHLORIDE 5 MG/1
10 TABLET ORAL EVERY 4 HOURS
Refills: 0 | Status: DISCONTINUED | OUTPATIENT
Start: 2023-03-14 | End: 2023-03-16

## 2023-03-14 RX ORDER — MAGNESIUM SULFATE 500 MG/ML
2 VIAL (ML) INJECTION ONCE
Refills: 0 | Status: COMPLETED | OUTPATIENT
Start: 2023-03-14 | End: 2023-03-14

## 2023-03-14 RX ORDER — IPRATROPIUM/ALBUTEROL SULFATE 18-103MCG
3 AEROSOL WITH ADAPTER (GRAM) INHALATION EVERY 6 HOURS
Refills: 0 | Status: DISCONTINUED | OUTPATIENT
Start: 2023-03-14 | End: 2023-03-16

## 2023-03-14 RX ORDER — SENNA PLUS 8.6 MG/1
2 TABLET ORAL AT BEDTIME
Refills: 0 | Status: DISCONTINUED | OUTPATIENT
Start: 2023-03-14 | End: 2023-03-16

## 2023-03-14 RX ORDER — DEXAMETHASONE 0.5 MG/5ML
ELIXIR ORAL
Refills: 0 | Status: DISCONTINUED | OUTPATIENT
Start: 2023-03-14 | End: 2023-03-15

## 2023-03-14 RX ORDER — OXYCODONE HYDROCHLORIDE 5 MG/1
5 TABLET ORAL EVERY 4 HOURS
Refills: 0 | Status: DISCONTINUED | OUTPATIENT
Start: 2023-03-14 | End: 2023-03-16

## 2023-03-14 RX ORDER — ACETAMINOPHEN 500 MG
650 TABLET ORAL EVERY 6 HOURS
Refills: 0 | Status: DISCONTINUED | OUTPATIENT
Start: 2023-03-14 | End: 2023-03-16

## 2023-03-14 RX ORDER — METOCLOPRAMIDE HCL 10 MG
10 TABLET ORAL EVERY 8 HOURS
Refills: 0 | Status: DISCONTINUED | OUTPATIENT
Start: 2023-03-14 | End: 2023-03-15

## 2023-03-14 RX ORDER — ONDANSETRON 8 MG/1
4 TABLET, FILM COATED ORAL EVERY 6 HOURS
Refills: 0 | Status: DISCONTINUED | OUTPATIENT
Start: 2023-03-14 | End: 2023-03-16

## 2023-03-14 RX ORDER — ENOXAPARIN SODIUM 100 MG/ML
40 INJECTION SUBCUTANEOUS ONCE
Refills: 0 | Status: DISCONTINUED | OUTPATIENT
Start: 2023-03-14 | End: 2023-03-14

## 2023-03-14 RX ORDER — CHLORHEXIDINE GLUCONATE 213 G/1000ML
1 SOLUTION TOPICAL DAILY
Refills: 0 | Status: DISCONTINUED | OUTPATIENT
Start: 2023-03-14 | End: 2023-03-16

## 2023-03-14 RX ORDER — DEXAMETHASONE 0.5 MG/5ML
ELIXIR ORAL
Refills: 0 | Status: DISCONTINUED | OUTPATIENT
Start: 2023-03-14 | End: 2023-03-14

## 2023-03-14 RX ORDER — LEVETIRACETAM 250 MG/1
500 TABLET, FILM COATED ORAL EVERY 12 HOURS
Refills: 0 | Status: DISCONTINUED | OUTPATIENT
Start: 2023-03-14 | End: 2023-03-15

## 2023-03-14 RX ORDER — DIAZEPAM 5 MG
2 TABLET ORAL ONCE
Refills: 0 | Status: DISCONTINUED | OUTPATIENT
Start: 2023-03-14 | End: 2023-03-14

## 2023-03-14 RX ORDER — IRON SUCROSE 20 MG/ML
300 INJECTION, SOLUTION INTRAVENOUS EVERY 24 HOURS
Refills: 0 | Status: DISCONTINUED | OUTPATIENT
Start: 2023-03-14 | End: 2023-03-16

## 2023-03-14 RX ORDER — CEFAZOLIN SODIUM 1 G
2000 VIAL (EA) INJECTION EVERY 8 HOURS
Refills: 0 | Status: DISCONTINUED | OUTPATIENT
Start: 2023-03-14 | End: 2023-03-15

## 2023-03-14 RX ORDER — SODIUM CHLORIDE 9 MG/ML
1000 INJECTION INTRAMUSCULAR; INTRAVENOUS; SUBCUTANEOUS
Refills: 0 | Status: DISCONTINUED | OUTPATIENT
Start: 2023-03-14 | End: 2023-03-15

## 2023-03-14 RX ADMIN — IRON SUCROSE 66.25 MILLIGRAM(S): 20 INJECTION, SOLUTION INTRAVENOUS at 23:30

## 2023-03-14 RX ADMIN — CHLORHEXIDINE GLUCONATE 1 APPLICATION(S): 213 SOLUTION TOPICAL at 13:36

## 2023-03-14 RX ADMIN — ONDANSETRON 4 MILLIGRAM(S): 8 TABLET, FILM COATED ORAL at 22:00

## 2023-03-14 RX ADMIN — Medication 10 MILLIGRAM(S): at 22:53

## 2023-03-14 RX ADMIN — Medication 50 MILLIGRAM(S): at 11:55

## 2023-03-14 RX ADMIN — Medication 25 GRAM(S): at 23:21

## 2023-03-14 RX ADMIN — Medication 1 APPLICATION(S): at 16:05

## 2023-03-14 RX ADMIN — Medication 3 MILLILITER(S): at 22:10

## 2023-03-14 NOTE — PROGRESS NOTE ADULT - SUBJECTIVE AND OBJECTIVE BOX
NSCU ATTENDING -- ADDITIONAL PROGRESS NOTE    Nighttime rounds were performed -- please refer to earlier Progress Note for HPI details.      ICU Vital Signs Last 24 Hrs  T(C): 37.2 (14 Mar 2023 16:05), Max: 37.5 (14 Mar 2023 14:05)  T(F): 98.9 (14 Mar 2023 16:05), Max: 99.5 (14 Mar 2023 14:05)  HR: 110 (14 Mar 2023 16:05) (76 - 119)  BP: 113/70 (14 Mar 2023 16:05) (92/51 - 113/70)  BP(mean): 85 (14 Mar 2023 16:00) (66 - 85)  RR: 16 (14 Mar 2023 16:05) (14 - 58)  SpO2: 100% (14 Mar 2023 16:05) (95% - 100%)      03-13-23 @ 07:01  -  03-14-23 @ 07:00  --------------------------------------------------------  IN: 600 mL / OUT: 1350 mL / NET: -750 mL    03-14-23 @ 07:01  -  03-14-23 @ 19:17  --------------------------------------------------------  IN: 310 mL / OUT: 500 mL / NET: -190 mL      NEURO EXAM:  Neurological: Awake, alert oriented to person, place and time, following commands, PERRL 3mm, EOMI   Moving all extremities, muscle Strength normal in all four extremities, no drift, sensation intact      MEDICATIONS:  acetaminophen     Tablet .. 650 milliGRAM(s) Oral every 6 hours PRN  albuterol    90 MICROgram(s) HFA Inhaler 2 Puff(s) Inhalation daily PRN  chlorhexidine 2% Cloths 1 Application(s) Topical daily  influenza   Vaccine 0.5 milliLiter(s) IntraMuscular once  ondansetron    Tablet 4 milliGRAM(s) Oral every 6 hours PRN  senna 2 Tablet(s) Oral at bedtime  sodium chloride 0.9%. 1000 milliLiter(s) (65 mL/Hr) IV Continuous <Continuous>  sodium chloride 0.9%. 1000 milliLiter(s) (60 mL/Hr) IV Continuous <Continuous>  topiramate 50 milliGRAM(s) Oral daily      LABS:                     11.6   6.03  )-----------( 358      ( 14 Mar 2023 05:13 )             37.3     03-14    136  |  104  |  14  ----------------------------<  100<H>  4.2   |  23  |  0.95    Ca    10.0      14 Mar 2023 05:13  Phos  5.1     03-14  Mg     2.0     03-14    TPro  7.1  /  Alb  3.8  /  TBili  0.6  /  DBili  x   /  AST  17  /  ALT  9<L>  /  AlkPhos  82  03-12    LIVER FUNCTIONS - ( 12 Mar 2023 21:40 )  Alb: 3.8 g/dL / Pro: 7.1 g/dL / ALK PHOS: 82 U/L / ALT: 9 U/L / AST: 17 U/L / GGT: x             ASSESSMENT: Hydrocephalus 2/2 aqueductal stenosis. With transependymal flow.  Neuro: Q1 neurochecks. Post op*  Migraine: Continue home topamax   Respiratory: Room air  CV: SBP goal 100-140 mmhg   Endocrine: sugar 120-180   DVT ppx: SCDs. Start SQL pending OR  Microcytic anemia- send anemia w/u  Renal: IVL   ID: Afebrile   GI: Regular diet                     NSCU ATTENDING -- ADDITIONAL PROGRESS NOTE    Nighttime rounds were performed -- please refer to earlier Progress Note for HPI details.      ICU Vital Signs Last 24 Hrs  T(C): 37.2 (14 Mar 2023 16:05), Max: 37.5 (14 Mar 2023 14:05)  T(F): 98.9 (14 Mar 2023 16:05), Max: 99.5 (14 Mar 2023 14:05)  HR: 110 (14 Mar 2023 16:05) (76 - 119)  BP: 113/70 (14 Mar 2023 16:05) (92/51 - 113/70)  BP(mean): 85 (14 Mar 2023 16:00) (66 - 85)  RR: 16 (14 Mar 2023 16:05) (14 - 58)  SpO2: 100% (14 Mar 2023 16:05) (95% - 100%)      03-13-23 @ 07:01  -  03-14-23 @ 07:00  --------------------------------------------------------  IN: 600 mL / OUT: 1350 mL / NET: -750 mL    03-14-23 @ 07:01  -  03-14-23 @ 19:17  --------------------------------------------------------  IN: 310 mL / OUT: 500 mL / NET: -190 mL      NEURO EXAM:  Neurological: Awake, alert oriented to person, place and time, following commands, PERRL 3mm, EOMI   Moving all extremities, muscle Strength normal in all four extremities, no drift, sensation intact      MEDICATIONS:  acetaminophen     Tablet .. 650 milliGRAM(s) Oral every 6 hours PRN  albuterol    90 MICROgram(s) HFA Inhaler 2 Puff(s) Inhalation daily PRN  chlorhexidine 2% Cloths 1 Application(s) Topical daily  influenza   Vaccine 0.5 milliLiter(s) IntraMuscular once  ondansetron    Tablet 4 milliGRAM(s) Oral every 6 hours PRN  senna 2 Tablet(s) Oral at bedtime  sodium chloride 0.9%. 1000 milliLiter(s) (65 mL/Hr) IV Continuous <Continuous>  sodium chloride 0.9%. 1000 milliLiter(s) (60 mL/Hr) IV Continuous <Continuous>  topiramate 50 milliGRAM(s) Oral daily      LABS:                     11.6   6.03  )-----------( 358      ( 14 Mar 2023 05:13 )             37.3     03-14    136  |  104  |  14  ----------------------------<  100<H>  4.2   |  23  |  0.95    Ca    10.0      14 Mar 2023 05:13  Phos  5.1     03-14  Mg     2.0     03-14    TPro  7.1  /  Alb  3.8  /  TBili  0.6  /  DBili  x   /  AST  17  /  ALT  9<L>  /  AlkPhos  82  03-12    LIVER FUNCTIONS - ( 12 Mar 2023 21:40 )  Alb: 3.8 g/dL / Pro: 7.1 g/dL / ALK PHOS: 82 U/L / ALT: 9 U/L / AST: 17 U/L / GGT: x             ASSESSMENT: Hydrocephalus 2/2 aqueductal stenosis. With transependymal flow.  Neuro: Q1 neurochecks. Post op imaging  Migraine: Continue home topamax   Respiratory: Room air  CV: SBP goal 100-140 mmhg   Endocrine: sugar 120-180   DVT ppx: SCDs. Hold chemoppx for now  Microcytic anemia- LALITHA. Start IV iron  Renal: IVL   ID: Afebrile   GI: Regular diet                     NSCU ATTENDING -- ADDITIONAL PROGRESS NOTE    Nighttime rounds were performed -- please refer to earlier Progress Note for HPI details.      ICU Vital Signs Last 24 Hrs  T(C): 37.2 (14 Mar 2023 16:05), Max: 37.5 (14 Mar 2023 14:05)  T(F): 98.9 (14 Mar 2023 16:05), Max: 99.5 (14 Mar 2023 14:05)  HR: 110 (14 Mar 2023 16:05) (76 - 119)  BP: 113/70 (14 Mar 2023 16:05) (92/51 - 113/70)  BP(mean): 85 (14 Mar 2023 16:00) (66 - 85)  RR: 16 (14 Mar 2023 16:05) (14 - 58)  SpO2: 100% (14 Mar 2023 16:05) (95% - 100%)      03-13-23 @ 07:01  -  03-14-23 @ 07:00  --------------------------------------------------------  IN: 600 mL / OUT: 1350 mL / NET: -750 mL    03-14-23 @ 07:01  -  03-14-23 @ 19:17  --------------------------------------------------------  IN: 310 mL / OUT: 500 mL / NET: -190 mL      NEURO EXAM:  Neurological: Slow to emerge from anesthesia, oriented to person, following commands, PERRL 3mm, EOMI   Moving all extremities, muscle strength normal in all four extremities      MEDICATIONS:  acetaminophen     Tablet .. 650 milliGRAM(s) Oral every 6 hours PRN  albuterol    90 MICROgram(s) HFA Inhaler 2 Puff(s) Inhalation daily PRN  chlorhexidine 2% Cloths 1 Application(s) Topical daily  influenza   Vaccine 0.5 milliLiter(s) IntraMuscular once  ondansetron    Tablet 4 milliGRAM(s) Oral every 6 hours PRN  senna 2 Tablet(s) Oral at bedtime  sodium chloride 0.9%. 1000 milliLiter(s) (65 mL/Hr) IV Continuous <Continuous>  sodium chloride 0.9%. 1000 milliLiter(s) (60 mL/Hr) IV Continuous <Continuous>  topiramate 50 milliGRAM(s) Oral daily      LABS:                     11.6   6.03  )-----------( 358      ( 14 Mar 2023 05:13 )             37.3     03-14    136  |  104  |  14  ----------------------------<  100<H>  4.2   |  23  |  0.95    Ca    10.0      14 Mar 2023 05:13  Phos  5.1     03-14  Mg     2.0     03-14    TPro  7.1  /  Alb  3.8  /  TBili  0.6  /  DBili  x   /  AST  17  /  ALT  9<L>  /  AlkPhos  82  03-12    LIVER FUNCTIONS - ( 12 Mar 2023 21:40 )  Alb: 3.8 g/dL / Pro: 7.1 g/dL / ALK PHOS: 82 U/L / ALT: 9 U/L / AST: 17 U/L / GGT: x             ASSESSMENT: Hydrocephalus 2/2 aqueductal stenosis. With transependymal flow. POD 0 s/p ETV with EVD placement.  Neuro: Q1 neurochecks. EVD clamped at [id number]. Monitor ICPs.   Post op imaging MRI with CSF flow.  Migraine: Continue topamax per home regimen.   Decadron, Keppra.   Respiratory: Room air  CV: SBP goal 100-140 mmHg   Endocrine: sugar 140-180   DVT ppx: SCDs. Hold chemoppx for now as fresh post op  Microcytic anemia- labs consistent with LALITHA. Start IV iron.  Renal: IVF until good PO intake. KENIA Ji in am.   Reproductive: Large R adnexal cystic mass per OSH. Repeat pelvic US. GYN consult.   GI: Dysphagia and advance as tolerated  ID: Afebrile     Continue care per daytime intensivist Koby HUFFMAN     Additional 35 minutes of critical care time

## 2023-03-14 NOTE — PRE-OP CHECKLIST - IV STARTED
"Avis Us (86 y.o. Female)     Date of Birth Social Security Number Address Home Phone MRN    09/03/1931  [address] [address] [phone] [id number]    [place] Marital Status          None        Admission Date Admission Type Admitting Provider Attending Provider Department, Room/Bed    8/15/18 Emergency Charles Weinstein MD Thompson, John Randall, MD [facility] 2A ICU, N203/1    Discharge Date Discharge Disposition Discharge Destination                       Attending Provider:  Charles Weinstein MD    Allergies:  Biaxin [Clarithromycin], Metoprolol, Tramadol    Isolation:  None   Infection:  None   Code Status:  No CPR    Ht:  157.5 cm (62.01\")   Wt:  98.4 kg (216 lb 14.9 oz)    Admission Cmt:  None   Principal Problem:  Severe sepsis of urinary origin (CMS/[facility]) [A41.9,R65.20]                 Active Insurance as of 8/15/2018     Primary Coverage     Payor Plan Insurance Group Employer/Plan Group    MEDICARE MEDICARE A & B      Payor Plan Address Payor Plan Phone Number Effective From Effective To    [address] [phone] 9/1/1994     [facility] [address]       Subscriber Name Subscriber Birth Date Member ID       AVIS US 9/3/1931 [id number]           Secondary Coverage     Payor Plan Insurance Group Employer/Plan Group    AARP MED SUPP AARP HEALTH CARE OPTIONS      Payor Plan Address Payor Plan Phone Number Effective From Effective To    [facility] [phone] 1/1/2018     [address]       [facility] [address]       Subscriber Name Subscriber Birth Date Member ID       AVIS US 9/3/1931 [id number]                 Emergency Contacts      (Rel.) Home Phone Work Phone Mobile Phone    Jacquie Contreras (Daughter) -- -- [phone]            Emergency Contact Information     Name Relation Home Work Mobile    Jacquie Contreras Daughter   [phone]          Insurance Information                MEDICARE/MEDICARE A & B " Phone: [phone]    Subscriber: Avis Us Subscriber#: [id number]    Group#:  Precert#:         AAR MED SUPP/[facility] HEALTH CARE OPTIONS Phone: [phone]    Subscriber: Avis Us Subscriber#: [id number]    Group#:  Precert#:              History & Physical      Tami Torres MD at 8/15/2018  3:04 AM             ICU ADMISSION NOTE    Chief complaint:   Acute on chronic respiratory failure, UTI, hypotension    Subjective     Patient is a 86 y.o. female who  has a past medical history of Coronary artery disease; CVA (cerebral vascular accident) (CMS/[facility]); Diabetes mellitus (CMS/[facility]); Diastolic dysfunction; Emphysema of lung (CMS/[facility]); GERD (gastroesophageal reflux disease); Gout; Heart block, first degree; Hypertension; and Seizure (CMS/[facility]).    She has been in her regular level of health except having more dyspnea over the last 2 weeks requiring her to use her rescue inhalers more.  She has also had a 2 week H/O UTI symptoms (pyuria, frequency) but was waiting til she saw her PCP on Friday.  She has a previous history of frequent urinary tract infections with a colovesicular fistula. Ultimately she underwent resection of her fistula and a colostomy. Frequent urinary tract infections improved postoperative period however her daughter reports that she been complaining of burning upon urination for several weeks. She is a lifetime nonsmoker. However she had significant secondhand smoke exposure from her father and her  prior to his death. She also has a known history of asthma. She wears oxygen 3 L nasal cannula continuously as well as CPAP nightly.    This evening the patient developed sudden respiratory distress, fever,  and c/o nausea.   EMS found her laying on the floor w/ a spO2 in the 70's.  She was transferred to our facility.  En route she was alert and interactive however she became confused and somnolent upon arrival in the ED and required intubation.  Blood gas prior to  "intubation, pH 7.15, CO2 73, O2 199.     Review of Systems  Review of Systems   Constitutional: Positive for fatigue and fever. Negative for appetite change, chills and diaphoresis.   Respiratory: Positive for cough and wheezing. Negative for chest tightness.    Cardiovascular: Negative.    Gastrointestinal: Positive for nausea. Negative for vomiting.   Genitourinary: Positive for dysuria and frequency.        Home Medications:  Lasix 40mg QD, lisinopril 10 mg QD, Symbicort 80/4.5 BID, ASA 81 mg Qd, protonix 40 mg Q D, Keppra 750 mg BID, Klonopin 1 mg BID, allopurinol 100mg BID    (Not in a hospital admission)    History  Past Medical History:   Diagnosis Date   • Coronary artery disease    • CVA (cerebral vascular accident) (CMS/HCC)    • Diabetes mellitus (CMS/HCC)    • Diastolic dysfunction    • Emphysema of lung (CMS/HCC)    • GERD (gastroesophageal reflux disease)    • Gout    • Heart block, first degree    • Hypertension    • Seizure (CMS/HCC)      Past Surgical History:   Procedure Laterality Date   • CARDIAC CATHETERIZATION     • CHOLECYSTECTOMY     • COLOSTOMY     • COLOVAGINAL FISTULA REPAIR     • HERNIA REPAIR     • PACEMAKER IMPLANTATION       Family History   Problem Relation Age of Onset   • Cancer Mother    • Emphysema Father      Social History   Substance Use Topics   • Smoking status: Passive Smoke Exposure - Never Smoker   • Smokeless tobacco: Never Used   • Alcohol use No       (Not in a hospital admission)  Allergies:  Biaxin [clarithromycin]; Metoprolol; and Tramadol      Objective     Vital Signs  Blood pressure 102/58, pulse 78, temperature (!) 101.1 °F (38.4 °C), temperature source Core, resp. rate (!) 40, height 157.5 cm (62\"), weight 79.4 kg (175 lb), SpO2 99 %.    Physical Exam:  General Appearance:  Overweight elderly woman, sedated    Head:  Normocephalic, atraumatic    Eyes:          Pupils small, equal, reactive    Ears:     Throat: Orally intubated    Neck: Trachea midline, no " yes carotid bruit    Back:      Lungs:   Diffuse expiratory rhonchi     Heart:   + murmur, systolic, regular, paced    Abdomen:   Left lower quadrant colostomy, hypoactive bowel sounds, soft    Rectal:     Deferred   Extremities:    Pitting edema bilaterally    Pulses:   Faint pedal pulses    Skin: Warm and dry    Lymph nodes:    Neurologic:   Sedated but does arouse with stimulation        Results Review:   Lab Results (last 24 hours)     Procedure Component Value Units Date/Time    TSH [[id number]]  (Normal) Collected:  08/15/18 0040    Specimen:  Blood Updated:  08/15/18 0301     TSH 4.484 mIU/mL     Blood Culture - Blood, [[id number]] Collected:  08/15/18 0032    Specimen:  Blood from Arm, Right Updated:  08/15/18 0237    Blood Culture - Blood, [[id number]] Collected:  08/15/18 0038    Specimen:  Blood from Hand, Right Updated:  08/15/18 0236    Cortisol [[id number]] Collected:  08/15/18 0040    Specimen:  Blood Updated:  08/15/18 0236    Procalcitonin [[id number]]  (Normal) Collected:  08/15/18 0032    Specimen:  Blood from Arm, Right Updated:  08/15/18 0157     Procalcitonin 0.05 ng/mL     Narrative:       As a Marker for Sepsis (Non-Neonates):   1. <0.5 ng/mL represents a low risk of severe sepsis and/or septic shock.  2. >2 ng/mL represents a high risk of severe sepsis and/or septic shock.    As a Marker for Lower Respiratory Tract Infections that require antibiotic therapy:    PCT on Admission     Antibiotic Therapy       6-12 Hrs later  > 0.5                Strongly Recommended             >0.25 - <0.5         Recommended  0.1 - 0.25           Discouraged              Remeasure/reassess PCT  <0.1                 Strongly Discouraged     Remeasure/reassess PCT                     PCT values of < 0.5 ng/mL do not exclude an infection, because localized infections (without systemic signs) may be associated with such low concentrations, or a systemic infection in its initial stages (< 6 hours). Furthermore, increased  PCT can occur without infection. PCT concentrations between 0.5 and 2.0 ng/mL should be interpreted taking into account the patient's history. It is recommended to retest PCT within 6-24 hours if any concentrations < 2 ng/mL are obtained.    Lactic Acid, Plasma [[id number]]  (Abnormal) Collected:  08/15/18 0032    Specimen:  Blood from Arm, Right Updated:  08/15/18 0154     Lactate 5.3 (C) mmol/L      Comment: Falsely depressed results may occur on samples drawn from patients receiving N-Acetylcysteine (NAC) or Metamizole.       Lactic Acid, Reflex Timer (This will reflex a repeat order 3-3:15 hours after ordered.) [[id number]] Collected:  08/15/18 0032    Specimen:  Blood from Arm, Right Updated:  08/15/18 0154    [place] Draw [[id number]] Collected:  08/15/18 0040    Specimen:  Blood Updated:  08/15/18 0145    Narrative:       The following orders were created for panel order [place] Draw.  Procedure                               Abnormality         Status                     ---------                               -----------         ------                     Light Blue Top[[id number]]                                   Final result               Green Top (Gel)[[id number]]                                  Final result               Lavender Top[[id number]]                                     Final result               Gold Top - SST[[id number]]                                   Final result               Green Top (No Gel)[[id number]]                                                            Please view results for these tests on the individual orders.    Light Blue Top [[id number]] Collected:  08/15/18 0040    Specimen:  Blood Updated:  08/15/18 0145     Extra Tube hold for add-on     Comment: Auto resulted       Green Top (Gel) [[id number]] Collected:  08/15/18 0040    Specimen:  Blood Updated:  08/15/18 0145     Extra Tube Hold for add-ons.     Comment: Auto resulted.       Lavender Top [[id number]] Collected:   08/15/18 0040    Specimen:  Blood Updated:  08/15/18 0145     Extra Tube hold for add-on     Comment: Auto resulted       Gold Top - SST [[id number]] Collected:  08/15/18 0040    Specimen:  Blood Updated:  08/15/18 0145     Extra Tube Hold for add-ons.     Comment: Auto resulted.       Urinalysis With Culture If Indicated - Urine, Catheter [[id number]]  (Abnormal) Collected:  08/15/18 0125    Specimen:  Urine from Urine, Catheter Updated:  08/15/18 0139     Color, UA Yellow     Appearance, UA Cloudy (A)     pH, UA <=5.0     Specific Gravity, UA 1.014     Glucose, UA Negative     Ketones, UA Negative     Bilirubin, UA Negative     Blood, UA Small (1+) (A)     Protein, UA Negative     Leuk Esterase, UA Large (3+) (A)     Nitrite, UA Positive (A)     Urobilinogen, UA 0.2 E.U./dL    Urinalysis, Microscopic Only - Urine, Clean Catch [[id number]]  (Abnormal) Collected:  08/15/18 0125    Specimen:  Urine from Urine, Catheter Updated:  08/15/18 0139     RBC, UA 7-12 (A) /HPF      WBC, UA Too Numerous to Count (A) /HPF      Bacteria, UA 4+ (A) /HPF      Squamous Epithelial Cells, UA None Seen /HPF      Hyaline Casts, UA 0-6 /LPF      Methodology Automated Microscopy    Urine Culture - Urine, [[id number]] Collected:  08/15/18 0125    Specimen:  Urine from Urine, Catheter Updated:  08/15/18 0139    BNP [[id number]]  (Abnormal) Collected:  08/15/18 0040    Specimen:  Blood Updated:  08/15/18 0134     .0 (H) pg/mL      Comment: Results may be falsely decreased if patient taking Biotin.       Comprehensive Metabolic Panel [[id number]]  (Abnormal) Collected:  08/15/18 0040    Specimen:  Blood Updated:  08/15/18 0126     Glucose 246 (H) mg/dL      BUN 26 (H) mg/dL      Creatinine 1.67 (H) mg/dL      Sodium 138 mmol/L      Potassium 3.9 mmol/L      Chloride 100 mmol/L      CO2 29.0 mmol/L      Calcium 9.5 mg/dL      Total Protein 6.8 g/dL      Albumin 4.12 g/dL      ALT (SGPT) 10 U/L      AST (SGOT) 20 U/L      Alkaline Phosphatase  217 (H) U/L      Total Bilirubin 0.4 mg/dL      eGFR Non African Amer 29 (L) mL/min/1.73      Globulin 2.7 gm/dL      A/G Ratio 1.5 g/dL      BUN/Creatinine Ratio 15.6     Anion Gap 9.0 mmol/L     Narrative:       [employer] Guidelines    Stage     Description        GFR  1         Normal or High     90+  2         Mild decrease      60-89  3         Moderate decrease  30-59  4         Severe decrease    15-29  5         Kidney failure     <15    POC Troponin, Rapid [[id number]]  (Normal) Collected:  08/15/18 0046    Specimen:  Blood Updated:  08/15/18 0100     Troponin I 0.00 ng/mL      Comment: Serial Number: [id number]:  [id number]       CBC & Differential [[id number]] Collected:  08/15/18 0040    Specimen:  Blood Updated:  08/15/18 0059    Narrative:       The following orders were created for panel order CBC & Differential.  Procedure                               Abnormality         Status                     ---------                               -----------         ------                     Scan Slide[[id number]]                                                                  CBC Auto Differential[[id number]]        Abnormal            Final result                 Please view results for these tests on the individual orders.    CBC Auto Differential [[id number]]  (Abnormal) Collected:  08/15/18 0040    Specimen:  Blood Updated:  08/15/18 0059     WBC 22.08 (H) 10*3/mm3      RBC 3.74 (L) 10*6/mm3      Hemoglobin 11.6 g/dL      Hematocrit 39.5 %      .6 (H) fL      MCH 31.0 pg      MCHC 29.4 (L) g/dL      RDW 14.5 %      RDW-SD 55.9 (H) fl      MPV 10.5 fL      Platelets 484 (H) 10*3/mm3      Neutrophil % 71.6 (H) %      Lymphocyte % 22.1 (L) %      Monocyte % 3.0 %      Eosinophil % 3.2 (H) %      Basophil % 0.1 %      Immature Grans % 0.4 %      Neutrophils, Absolute 15.80 (H) 10*3/mm3      Lymphocytes, Absolute 4.88 (H) 10*3/mm3      Monocytes, Absolute 0.66 10*3/mm3      Eosinophils,  Absolute 0.71 (H) 10*3/mm3      Basophils, Absolute 0.03 10*3/mm3      Immature Grans, Absolute 0.09 (H) 10*3/mm3     Blood Gas, Arterial [[id number]]  (Abnormal) Collected:  08/15/18 0043    Specimen:  Arterial Blood Updated:  08/15/18 0049     Site Arterial: left radial     Remi's Test Positive     pH, Arterial 7.149 (C) pH units      pCO2, Arterial 72.8 (C) mm Hg      pO2, Arterial 199.0 (H) mm Hg      HCO3, Arterial 25.3 mmol/L      Base Excess, Arterial -4.6 (L) mmol/L      O2 Saturation, Arterial 97.6 %      Hemoglobin, Blood Gas 11.3 (L) g/dL      Hematocrit, Blood Gas 34.6 %      Oxyhemoglobin 97.6 %      Methemoglobin 0.90 %      Carboxyhemoglobin 0.6 %      CO2 Content 27.5     Barometric Pressure for Blood Gas -- mmHg      Comment: N/A        Modality Cannula     FIO2 60 %     POC CHEM 8 [[id number]]  (Abnormal) Collected:  08/15/18 0040    Specimen:  Blood Updated:  08/15/18 0045     Glucose 236 (H) mg/dL      BUN 29 (H) mg/dL      Creatinine 1.60 (H) mg/dL      Sodium 140 mmol/L      Potassium 4.0 mmol/L      Chloride 99 mmol/L      Total CO2 30 (H) mmol/L      Hemoglobin 12.6 g/dL      Comment: Serial Number: [id number]:  [id number]        Hematocrit 37 (L) %      Ionized Calcium 1.27 mmol/L         Imaging Results (last 24 hours)     Procedure Component Value Units Date/Time    XR Chest 1 View [[id number]] Collected:  08/15/18 0030     Updated:  08/15/18 0246    Narrative:       EXAM:    XR Chest, 1 View     EXAM DATE/TIME:    8/15/2018 12:30 AM     CLINICAL HISTORY:    86 years old, female; Signs and symptoms and device placement; Ett placement   (vent status); Shortness of breath; Additional info: SOA triage protocol     TECHNIQUE:    XR of the chest, 1 view.     COMPARISON:    CR - XR CHEST 1 VW 2017-11-01 06:35     FINDINGS:    Tubes, lines and devices:  Endotracheal tube is in place with the tip   approximately 3.2 cm above the cas. A left-sided pacemaker is visualized.    Lungs:  Bibasilar  atelectatic change/infiltrates are visualized. There are   patchy hazy opacification of the right lung, which is a progression. There is a   diffuse increase in interstitial markings, which is nonspecific. Possible   etiologies include interstitial edema and atypical infection. The lungs are   hyperinflated, consistent with COPD. There is prominence of the pulmonary   vascularity.    Pleural space:  No pneumothorax. No pleural effusions. There is mild right   apical pleural thickening.    Heart/Mediastinum: There is borderline cardiomegaly.   Bones/joints:  There is stable vertebroplasty within the lower thoracic/upper   lumbar spine.       Impression:       1. Bibasilar atelectatic change/infiltrates are visualized. There are patchy   hazy opacification of the right lung, which is a progression. Clinical   correlation and follow-up radiographs are recommended.   2. There is a diffuse increase in interstitial markings, which is nonspecific.   Possible etiologies include interstitial edema and atypical infection.   3. The lungs are hyperinflated, consistent with COPD.   4. There is prominence of the pulmonary vascularity.   5. There is mild right apical pleural thickening.   6. There is borderline cardiomegaly.  7. Endotracheal tube is identified.    THIS DOCUMENT HAS BEEN ELECTRONICALLY SIGNED BY JEREMIE CHANG MD           PROBLEM LIST    Principal Problem:    Severe sepsis of urinary origin (CMS/HCC)  Active Problems:    Acute on chronic respiratory failure with hypoxia and hypercapnia (CMS/HCC)    ROBBIN (acute kidney injury) (CMS/HCC)    Lactic acidosis    Type 2 diabetes mellitus (CMS/HCC)    CHF (congestive heart failure) (CMS/HCC)    COPD (chronic obstructive pulmonary disease) (CMS/HCC)    UTI (urinary tract infection)    Seizure (CMS/HCC)    GERD (gastroesophageal reflux disease)    Sleep apnea, obstructive    86-year-old woman, nonsmoker with asthma, coronary artery disease, congestive heart failure, mitral  valve regurgitation, Alport's syndrome with chronic kidney disease, diabetes, seizure disorder presenting with the sudden onset of severe respiratory distress. She required intubation in the emergency room. She had a temperature 101 and a white count of 22,000 suspicious for sepsis. Her pro-calcitonin however is low. Clinically she has a urinary tract infection. However, perhaps her respiratory decline is secondary to congestive heart failure or a bronchitis. She has a previous history of coronary artery disease. Unfortunately her records have yet to be merged I cannot look up her cath report from 2014. She is ventricular paced with a previous history of severe bradycardia.    Assessment/Plan      -ICU  - [facility] vent. F/U ABG  - CVC  - fluid administration, pressors if needed  - F/U lactic acid, cortisol, HA1c, renal function  - await Urine Cx, BC x 2 pending  - Vanco/Merrem (given recurrence of E coli and increased resistance to other ABX).  Can downgrade once organism known  - ECHO in am  -Troponin, CK with MB  - resume Keppra   - her daughter states that she should be in our system, will search for other encounters  - GI & DVT PPX  -Sliding-scale insulin      Louis Palacios, APRN  08/15/18  3:14 AM    I interviewed the patient's daughter, performed the above physical examination, reviewed her x-ray and lab work, dictated the above summary and modified the plan reflect my additional    Tami Torres MD    Time: 60min    This note was produced with a voice recognition program and may have uncorrected errors.           Electronically signed by aTmi Torres MD at 8/15/2018  4:06 AM

## 2023-03-14 NOTE — PROGRESS NOTE ADULT - SUBJECTIVE AND OBJECTIVE BOX
***********************************************  ADULT NSICU PROGRESS NOTE  CLAIRE RICHARDS [id number] [facility] 08EA [id number]  ***********************************************    24H INTERVAL EVENTS:        ROS: negative except per mentioned above in 24h interval events.      VITALS:    ICU Vital Signs Last 24 Hrs  T(C): 36.3 (13 Mar 2023 05:15), Max: 37.1 (12 Mar 2023 20:06)  T(F): 97.4 (13 Mar 2023 05:15), Max: 98.7 (12 Mar 2023 20:06)  HR: 73 (13 Mar 2023 07:00) (68 - 104)  BP: 91/56 (13 Mar 2023 07:00) (91/56 - 127/81)  BP(mean): 68 (13 Mar 2023 07:00) (68 - 96)  ABP: --  ABP(mean): --  RR: 15 (13 Mar 2023 07:00) (15 - 18)  SpO2: 97% (13 Mar 2023 07:00) (96% - 99%)    O2 Parameters below as of 13 Mar 2023 07:00  Patient On (Oxygen Delivery Method): room air                I&O's Summary    12 Mar 2023 07:01  -  13 Mar 2023 07:00  --------------------------------------------------------  IN: 150 mL / OUT: 475 mL / NET: -325 mL        EXAM:     Reema Coma Scale: 15    General: normocephalic, atraumatic, laying in bed, in no distress  Neuro     MS: A/Ox3, cooperative, normal attention, no neglect, comprehension intact, speech with preserved fluency, naming, and repetition    CN: PERRL, VF FTC, EOMI and no ptosis bilaterally, sensation intact to crude touch V1-V3, face symmetric, hearing grossly intact    Mot: bulk normal, tone normal, power 5/5 in bilateral upper and lower proximal extremities    Sens: intact to crude touch in bilateral upper and lower extremities    Reflexes: deferred    Coord: no dysmetria or ataxia on finger to nose/heel to shin, respectively, no focal bradykinetic movements    Gait: deferred  Chest: nonlabored respirations, no adventitious lung sounds bilaterally, heart regular rate/rhythm, present S1/S2, no murmurs or rubs  Abdomen: nondistended, soft and nontender without peritoneal signs, normoactive bowel sounds  Extremities: no clubbing, well-perfused, no edema    LABORATORY DATA:                            10.3   4.86  )-----------( 357      ( 13 Mar 2023 05:30 )             33.9     03-13    140  |  104  |  12  ----------------------------<  101<H>  4.3   |  25  |  0.77    Ca    9.6      13 Mar 2023 05:30  Phos  4.5     03-13  Mg     1.9     03-13    TPro  7.1  /  Alb  3.8  /  TBili  0.6  /  DBili  x   /  AST  17  /  ALT  9<L>  /  AlkPhos  82  03-12    LIVER FUNCTIONS - ( 12 Mar 2023 21:40 )  Alb: 3.8 g/dL / Pro: 7.1 g/dL / ALK PHOS: 82 U/L / ALT: 9 U/L / AST: 17 U/L / GGT: x               IMAGING DATA:    CARDIOLOGY DATA:    MICROBIOLOGY DATA:        MEDICATIONS  (STANDING):  insulin lispro (ADMELOG) corrective regimen sliding scale   SubCutaneous three times a day before meals  senna 2 Tablet(s) Oral at bedtime  topiramate 50 milliGRAM(s) Oral daily    MEDICATIONS  (PRN):  acetaminophen     Tablet .. 650 milliGRAM(s) Oral every 6 hours PRN Temp greater or equal to 38C (100.4F), Mild Pain (1 - 3)  albuterol    90 MICROgram(s) HFA Inhaler 2 Puff(s) Inhalation daily PRN Shortness of Breath and/or Wheezing      ***********************************************  ASSESSMENT AND PLAN  ***********************************************      #Communicating hydrocephalus, ?aqueductal stenosis  #History of migraine headaches, prior syncopal episodes    NEURO - admit NSICU, Q4h neuro checks / Q4h vital signs, f/u final MRI w/wo aaron read, suggestive of aqueductal stenosis, PT/OT, pain control, home topamax  PULM - SpO2 goal > 92%, supplemental O2 and pulm toileting as needed  CARDIO - BP goal NORMOTENSION  GI - bowel regimen, stool count, diet: ADAT    /RENAL - monitor UOP/volume status, BUN/SCr  HEME - maintain Hb > 7.0, PLT > 100,000  ID - monitor for infectious s/s, fever curve, leukocytosis  ENDO - SGlu goal < 200    ICU stepdown Checklist:    [X] hemodynamically stable – VS WNL and stable x 24hours, UO adequate  [X] if  previously on HDA - off pressors x 24h with stable neuro exam   [X] no new symptoms x 24h (i.e. new fever, new-onset nausea/vomiting)  [X] stable labs: (i.e. WBC not rising, sodium not dropping)  [X] patient not at high risk for aspiration, if high risk then:                  [ ] should have definitive plans for trach/PEG (alternative option is to discharge from ICU to facilty)                  [ ] stepdown to bed close to nurse’s station  [X] low suctioning requirements (i.e. q4h or less)  [X] sign-off from primary RN*  [X] drains do not require ICU level of care  [X] if patient previously agitated or with behavioral issues – controlled  [X] pain controlled ***********************************************  ADULT NSICU PROGRESS NOTE  CLAIRE RICHARDS [id number] [facility] 08EA [id number]  ***********************************************    24H INTERVAL EVENTS:  - No acute overnight events  - MRI brain c/f aqueductal stenosis      ROS: negative except per mentioned above in 24h interval events.      VITALS:  ICU Vital Signs Last 24 Hrs  T(C): 37.2 (14 Mar 2023 09:05), Max: 37.2 (13 Mar 2023 21:42)  T(F): 98.9 (14 Mar 2023 09:05), Max: 99 (14 Mar 2023 05:28)  HR: 90 (14 Mar 2023 06:35) (76 - 114)  BP: 99/64 (14 Mar 2023 06:35) (92/51 - 119/86)  BP(mean): 78 (14 Mar 2023 06:35) (66 - 99)  ABP: --  ABP(mean): --  RR: 16 (14 Mar 2023 06:35) (14 - 18)  SpO2: 97% (14 Mar 2023 06:35) (95% - 98%)    O2 Parameters below as of 14 Mar 2023 06:35  Patient On (Oxygen Delivery Method): room air              EXAM:     Reema Coma Scale: 15    General: normocephalic, atraumatic, laying in bed, in no distress  Neuro     MS: A/Ox3, cooperative, normal attention, no neglect, comprehension intact, speech with preserved fluency, naming, and repetition    CN: PERRL, VF FTC, EOMI and no ptosis bilaterally, sensation intact to crude touch V1-V3, face symmetric, hearing grossly intact    Mot: bulk normal, tone normal, power 5/5 in bilateral upper and lower proximal extremities    Sens: intact to crude touch in bilateral upper and lower extremities    Reflexes: deferred    Coord: no dysmetria or ataxia on finger to nose/heel to shin, respectively, no focal bradykinetic movements    Gait: deferred  Chest: nonlabored respirations, no adventitious lung sounds bilaterally, heart regular rate/rhythm, present S1/S2, no murmurs or rubs  Abdomen: nondistended, soft and nontender without peritoneal signs, normoactive bowel sounds  Extremities: no clubbing, well-perfused, no edema    LABORATORY DATA:                            11.6   6.03  )-----------( 358      ( 14 Mar 2023 05:13 )             37.3     03-14    136  |  104  |  14  ----------------------------<  100<H>  4.2   |  23  |  0.95    Ca    10.0      14 Mar 2023 05:13  Phos  5.1     03-14  Mg     2.0     03-14    TPro  7.1  /  Alb  3.8  /  TBili  0.6  /  DBili  x   /  AST  17  /  ALT  9<L>  /  AlkPhos  82  03-12      MEDICATIONS  (STANDING):  chlorhexidine 2% Cloths 1 Application(s) Topical daily  influenza   Vaccine 0.5 milliLiter(s) IntraMuscular once  povidone iodine 5% Nasal Swab 1 Application(s) Both Nostrils once  senna 2 Tablet(s) Oral at bedtime  sodium chloride 0.9%. 1000 milliLiter(s) (65 mL/Hr) IV Continuous <Continuous>  topiramate 50 milliGRAM(s) Oral daily    MEDICATIONS  (PRN):  acetaminophen     Tablet .. 650 milliGRAM(s) Oral every 6 hours PRN Temp greater or equal to 38C (100.4F), Mild Pain (1 - 3)  albuterol    90 MICROgram(s) HFA Inhaler 2 Puff(s) Inhalation daily PRN Shortness of Breath and/or Wheezing      ***********************************************  ASSESSMENT AND PLAN  ***********************************************      #Aqueductal stenosis/obstructive hydrocephalus  #History of migraine headaches, prior syncopal episodes    NEURO - admit NSICU, Q4h neuro checks / Q4h vital signs, f/u final MRI w/wo aaron read, suggestive of aqueductal stenosis, PT/OT, pain control, home topamax, likely OR tomorrow  PULM - SpO2 goal > 92%, supplemental O2 and pulm toileting as needed  CARDIO - BP goal NORMOTENSION  GI - bowel regimen, stool count, diet: NPO UFN  /RENAL - monitor UOP/volume status, BUN/SCr  HEME - maintain Hb > 7.0, PLT > 100,000  ID - monitor for infectious s/s, fever curve, leukocytosis  ENDO - SGlu goal < 200

## 2023-03-14 NOTE — PROGRESS NOTE ADULT - SUBJECTIVE AND OBJECTIVE BOX
Surgery: Endoscopic third ventriculostomy   Consent: Signed by patient      No Known Allergies      OVERNIGHT EVENTS: MUNA overnight. Patient resting comfortably.     T(C): 37.2 (03-14-23 @ 09:05), Max: 37.2 (03-13-23 @ 21:42)  HR: 90 (03-14-23 @ 06:35) (76 - 114)  BP: 99/64 (03-14-23 @ 06:35) (92/51 - 119/86)  RR: 16 (03-14-23 @ 06:35) (14 - 20)  SpO2: 97% (03-14-23 @ 06:35) (95% - 98%)  Wt(kg): --      PHYSICAL EXAM:  Constitutional: awake, alert, sitting up in bed. NAD.   Respiratory: non-labored breathing. Normal chest rise.   Cardiovascular: Regular rate and rhythm  Gastrointestinal:  Soft, nontender, nondistended.  .  Vascular: Extremities warm, no ulcers, no discoloration of skin.   Neurological: Gen: AA&O x 3, conversant, appropriate.      CN II-XII grossly intact.    Motor: FORDE x 4, 5/5 throughout UE/LE.    Sens: Sensation intact to light touch throughout.    Extremities: warm and well perfused      No pronator drift, no dysmetria.    03-14    136  |  104  |  14  ----------------------------<  100<H>  4.2   |  23  |  0.95    Ca    10.0      14 Mar 2023 05:13  Phos  5.1     03-14  Mg     2.0     03-14    TPro  7.1  /  Alb  3.8  /  TBili  0.6  /  DBili  x   /  AST  17  /  ALT  9<L>  /  AlkPhos  82  03-12    CBC Full  -  ( 14 Mar 2023 05:13 )  WBC Count : 6.03 K/uL  RBC Count : 4.91 M/uL  Hemoglobin : 11.6 g/dL  Hematocrit : 37.3 %  Platelet Count - Automated : 358 K/uL  Mean Cell Volume : 76.0 fl  Mean Cell Hemoglobin : 23.6 pg  Mean Cell Hemoglobin Concentration : 31.1 gm/dL  Auto Neutrophil # : x  Auto Lymphocyte # : x  Auto Monocyte # : x  Auto Eosinophil # : x  Auto Basophil # : x  Auto Neutrophil % : x  Auto Lymphocyte % : x  Auto Monocyte % : x  Auto Eosinophil % : x  Auto Basophil % : x        Pregnancy test:  Type & Screen (in past 72hrs): negative     CXR: n/a  EKG: NSR   ECHO: n/a  Medical Clearances: Medically optimized by neurointensivist   Other Clearances:     Last dose of antiplatelet/anticoagulation drug:    Implanted Devices (pacemaker, drug pump...etc):  []YES   [] NO                  If yes --> EPS consulted to interrogate/adjust device:    3M nasal swab ordered?  _Y  _N  Cranial surgery: Order written for hair to be shampooed night before surgery  [] yes   []no                 Assessment:    Plan:    Assessment:  Present when checked    []  GCS  E   V  M     Heart Failure: []Acute, [] acute on chronic , []chronic  Heart Failure:  [] Diastolic (HFpEF), [] Systolic (HFrEF), []Combined (HFpEF and HFrEF), [] RHF, [] Pulm HTN, [] Other    [] BARBARA, [] ATN, [] AIN, [] other  [] CKD1, [] CKD2, [] CKD 3, [] CKD 4, [] CKD 5, []ESRD    Encephalopathy: [] Metabolic, [] Hepatic, [] toxic, [] Neurological, [] Other    Abnormal Nurtitional Status: [] malnurtition (see nutrition note), [ ]underweight: BMI < 19, [] morbid obesity: BMI >40, [] Cachexia    [] Sepsis  [] hypovolemic shock,[] cardiogenic shock, [] hemorrhagic shock, [] neuogenic shock  [] Acute Respiratory Failure  []Cerebral edema, [] Brain compression/ herniation,   [] Functional quadriplegia  [] Acute blood loss anemia   Surgery: Endoscopic third ventriculostomy   Consent: Signed by patient      No Known Allergies      OVERNIGHT EVENTS: MUNA overnight. Patient resting comfortably.     T(C): 37.2 (03-14-23 @ 09:05), Max: 37.2 (03-13-23 @ 21:42)  HR: 90 (03-14-23 @ 06:35) (76 - 114)  BP: 99/64 (03-14-23 @ 06:35) (92/51 - 119/86)  RR: 16 (03-14-23 @ 06:35) (14 - 20)  SpO2: 97% (03-14-23 @ 06:35) (95% - 98%)  Wt(kg): --      PHYSICAL EXAM:  Constitutional: awake, alert, sitting up in bed. NAD.   Respiratory: non-labored breathing. Normal chest rise.   Cardiovascular: Regular rate and rhythm  Gastrointestinal:  Soft, nontender, nondistended.  .  Vascular: Extremities warm, no ulcers, no discoloration of skin.   Neurological: Gen: AA&O x 3, conversant, appropriate.      CN II-XII grossly intact.    Motor: FORDE x 4, 5/5 throughout UE/LE.    Sens: Sensation intact to light touch throughout.    Extremities: warm and well perfused      No pronator drift, no dysmetria.    03-14    136  |  104  |  14  ----------------------------<  100<H>  4.2   |  23  |  0.95    Ca    10.0      14 Mar 2023 05:13  Phos  5.1     03-14  Mg     2.0     03-14    TPro  7.1  /  Alb  3.8  /  TBili  0.6  /  DBili  x   /  AST  17  /  ALT  9<L>  /  AlkPhos  82  03-12    CBC Full  -  ( 14 Mar 2023 05:13 )  WBC Count : 6.03 K/uL  RBC Count : 4.91 M/uL  Hemoglobin : 11.6 g/dL  Hematocrit : 37.3 %  Platelet Count - Automated : 358 K/uL  Mean Cell Volume : 76.0 fl  Mean Cell Hemoglobin : 23.6 pg  Mean Cell Hemoglobin Concentration : 31.1 gm/dL  Auto Neutrophil # : x  Auto Lymphocyte # : x  Auto Monocyte # : x  Auto Eosinophil # : x  Auto Basophil # : x  Auto Neutrophil % : x  Auto Lymphocyte % : x  Auto Monocyte % : x  Auto Eosinophil % : x  Auto Basophil % : x        Pregnancy test:  Type & Screen (in past 72hrs): negative     CXR: n/a  EKG: NSR   ECHO: n/a  Medical Clearances: Medically optimized by neurointensivist   Other Clearances:     Last dose of antiplatelet/anticoagulation drug: 3/13 LMWH     Implanted Devices (pacemaker, drug pump...etc):  []YES   [x] NO                  If yes --> EPS consulted to interrogate/adjust device:    3M nasal swab ordered?  xY  _N  Cranial surgery: Order written for hair to be shampooed night before surgery  [x] yes   []no                 Assessment:17F hx of migraines, asthma, several years of syncopal episodes initially presented to [facility] on 3/12/23 for a syncopal event, CTH showing moderate obstructive hydrocephalus and CT orbits/facial bones which was negative. Transferred to [facility] for further workup.    Plan:  - consent for surgery/marlon/navigation/neuromonitoring obtained from patient   - T+Sx2  - Covid negative 3/12/23  - 2u PRBC on hold for OR  - Shampoo patients hair before OR   - 3M nasal swab, chlorhexidene wipes   - coags  - prophylactic lovenox held   - NPO since 8:30AM 3/14  - IVF     Assessment:  Present when checked    []  GCS  E   V  M     Heart Failure: []Acute, [] acute on chronic , []chronic  Heart Failure:  [] Diastolic (HFpEF), [] Systolic (HFrEF), []Combined (HFpEF and HFrEF), [] RHF, [] Pulm HTN, [] Other    [] BARBARA, [] ATN, [] AIN, [] other  [] CKD1, [] CKD2, [] CKD 3, [] CKD 4, [] CKD 5, []ESRD    Encephalopathy: [] Metabolic, [] Hepatic, [] toxic, [] Neurological, [] Other    Abnormal Nurtitional Status: [] malnurtition (see nutrition note), [ ]underweight: BMI < 19, [] morbid obesity: BMI >40, [] Cachexia    [] Sepsis  [] hypovolemic shock,[] cardiogenic shock, [] hemorrhagic shock, [] neuogenic shock  [] Acute Respiratory Failure  []Cerebral edema, [] Brain compression/ herniation,   [] Functional quadriplegia  [] Acute blood loss anemia

## 2023-03-14 NOTE — PROGRESS NOTE ADULT - SUBJECTIVE AND OBJECTIVE BOX
HPI:  17F hx of migraines, asthma, several years of syncopal episodes presented to [facility] on 3/12/23 for a syncopal event. Patient states she was in her kitchen getting ready for work and suddenly felt lightheaded and had cold sweats. She states it was an unwitnessed fall and landed on her face chipping her front tooth. She was brought to [place] and underwent CTH showing moderate obstructive hydrocephalus and CT orbits/facial bones which was negative. She states she had a similar syncopal episode most recently last year and another 3 years ago. She states she was told to see a neurosurgeon for "increased fluid" after an outpatient MRI brain was done but did not follow-up, at this time she also was seen by a cardiologist for syncope work-up that was reportedly negative. She denies any symptoms currently and is accompanied by her father.   (13 Mar 2023 00:03)    OVERNIGHT EVENTS: MUNA    Hospital Course:   3/12: patient admitted for obstructive hydrocephalus, pending further imaging/workup  3/13: MUNA overnight, MRI/MRV completed. ISS dc'd. s/p lovenox 40 mg x 1. Ativan 1.5 mcg given for MRI CSF flow study and MRV done. ?Aquaductal stenosis pending final read. Possible plan for ETV, to be discussed with patient and family.   3/14: MUNA o/n neuro stable    Vital Signs Last 24 Hrs  T(C): 36.5 (14 Mar 2023 00:29), Max: 37.2 (13 Mar 2023 21:42)  T(F): 97.7 (14 Mar 2023 00:29), Max: 98.9 (13 Mar 2023 21:42)  HR: 81 (14 Mar 2023 01:00) (68 - 114)  BP: 92/51 (14 Mar 2023 01:00) (91/56 - 119/86)  BP(mean): 66 (14 Mar 2023 01:00) (66 - 99)  RR: 14 (14 Mar 2023 01:00) (14 - 20)  SpO2: 95% (14 Mar 2023 01:00) (95% - 99%)    Parameters below as of 14 Mar 2023 01:00  Patient On (Oxygen Delivery Method): room air        I&O's Summary    12 Mar 2023 07:01  -  13 Mar 2023 07:00  --------------------------------------------------------  IN: 150 mL / OUT: 475 mL / NET: -325 mL    13 Mar 2023 07:01  -  14 Mar 2023 01:19  --------------------------------------------------------  IN: 600 mL / OUT: 1050 mL / NET: -450 mL        PHYSICAL EXAM:  GEN: laying in bed, NAD  NEURO: AOx3. FC, OE spont, speech intact, face symmetric. CNII-XII intact. PERRL, EOMI. No pronator drift. MAEx4. 5/5 strength throughout. SILT  CV: RRR +S1/S2  PULM: CTAB  GI: Abd soft, NT/ND  EXT: ext warm, dry, nontender    TUBES/LINES:  [] Ji  [] Lumbar Drain  [] Wound Drains  [] Others      DIET:  [] NPO  [x] Mechanical  [] Tube feeds    LABS:                        10.3   4.86  )-----------( 357      ( 13 Mar 2023 05:30 )             33.9     03-13    140  |  104  |  12  ----------------------------<  101<H>  4.3   |  25  |  0.77    Ca    9.6      13 Mar 2023 05:30  Phos  4.5     03-13  Mg     1.9     03-13    TPro  7.1  /  Alb  3.8  /  TBili  0.6  /  DBili  x   /  AST  17  /  ALT  9<L>  /  AlkPhos  82  03-12            CAPILLARY BLOOD GLUCOSE      POCT Blood Glucose.: 99 mg/dL (13 Mar 2023 06:34)      Drug Levels: [] N/A    CSF Analysis: [] N/A      Allergies    No Known Allergies    Intolerances      MEDICATIONS:  Antibiotics:    Neuro:  acetaminophen     Tablet .. 650 milliGRAM(s) Oral every 6 hours PRN  topiramate 50 milliGRAM(s) Oral daily    Anticoagulation:    OTHER:  albuterol    90 MICROgram(s) HFA Inhaler 2 Puff(s) Inhalation daily PRN  influenza   Vaccine 0.5 milliLiter(s) IntraMuscular once  senna 2 Tablet(s) Oral at bedtime    IVF:    CULTURES:    RADIOLOGY & ADDITIONAL TESTS:      ASSESSMENT:  17F hx of migraines, asthma, several years of syncopal episodes initially presented to [facility] on 3/12/23 for a syncopal event, CTH showing moderate obstructive hydrocephalus and CT orbits/facial bones which was negative. Transferred to [facility] for further workup.    HYDROCEPHALUS    Handoff    Migraine headache    Asthma    Obstructive hydrocephalus    [id number]        PLAN:  Neuro  - neuro/vital checks q4h  - home dose topamax for headaches  - CTH and CT orbits/facial bones completed at OSH  - MRIw/ CSF flow + 3D T2 /MRV completed 3/13    Cardio  - normotenisve    Pulm  - RA  - home dose albuterol 2puffs daily prn    GI  - regular diet  - bowel regimen     Renal  - IVL    :   - pelvic US pending (adnexal mass on OSH imaging)  - gyn consult pending    Endo  - ISS    Heme  - no active issues    ID  - afebrile     DISPO  - NSICU, family updated, full code    D/w Dr. Ross and Dr. Palmer    Assessment:  Present when checked    []  GCS  E   V  M     Heart Failure: []Acute, [] acute on chronic , []chronic  Heart Failure:  [] Diastolic (HFpEF), [] Systolic (HFrEF), []Combined (HFpEF and HFrEF), [] RHF, [] Pulm HTN, [] Other    [] BARBARA, [] ATN, [] AIN, [] other  [] CKD1, [] CKD2, [] CKD 3, [] CKD 4, [] CKD 5, []ESRD    Encephalopathy: [] Metabolic, [] Hepatic, [] toxic, [] Neurological, [] Other    Abnormal Nurtitional Status: [] malnurtition (see nutrition note), [ ]underweight: BMI < 19, [] morbid obesity: BMI >40, [] Cachexia    [] Sepsis  [] hypovolemic shock,[] cardiogenic shock, [] hemorrhagic shock, [] neuogenic shock  [] Acute Respiratory Failure  []Cerebral edema, [] Brain compression/ herniation,   [] Functional quadriplegia  [] Acute blood loss anemia

## 2023-03-14 NOTE — PRE-ANESTHESIA EVALUATION ADULT - NSANTHOSAYNRD_GEN_A_CORE
No. CARLEY screening performed.  STOP BANG Legend: 0-2 = LOW Risk; 3-4 = INTERMEDIATE Risk; 5-8 = HIGH Risk

## 2023-03-15 ENCOUNTER — APPOINTMENT (OUTPATIENT)
Dept: NEUROSURGERY | Facility: CLINIC | Age: 18
End: 2023-03-15
Payer: MEDICAID

## 2023-03-15 LAB
ANION GAP SERPL CALC-SCNC: 11 MMOL/L — SIGNIFICANT CHANGE UP (ref 5–17)
BUN SERPL-MCNC: 13 MG/DL — SIGNIFICANT CHANGE UP (ref 7–23)
CALCIUM SERPL-MCNC: 9.4 MG/DL — SIGNIFICANT CHANGE UP (ref 8.4–10.5)
CHLORIDE SERPL-SCNC: 104 MMOL/L — SIGNIFICANT CHANGE UP (ref 96–108)
CO2 SERPL-SCNC: 20 MMOL/L — LOW (ref 22–31)
CREAT SERPL-MCNC: 0.82 MG/DL — SIGNIFICANT CHANGE UP (ref 0.5–1.3)
EGFR: 106 ML/MIN/1.73M2 — SIGNIFICANT CHANGE UP
GLUCOSE SERPL-MCNC: 153 MG/DL — HIGH (ref 70–99)
HCT VFR BLD CALC: 30.2 % — LOW (ref 34.5–45)
HGB BLD-MCNC: 9.6 G/DL — LOW (ref 11.5–15.5)
MAGNESIUM SERPL-MCNC: 2.5 MG/DL — SIGNIFICANT CHANGE UP (ref 1.6–2.6)
MCHC RBC-ENTMCNC: 23.8 PG — LOW (ref 27–34)
MCHC RBC-ENTMCNC: 31.8 GM/DL — LOW (ref 32–36)
MCV RBC AUTO: 74.8 FL — LOW (ref 80–100)
NRBC # BLD: 0 /100 WBCS — SIGNIFICANT CHANGE UP (ref 0–0)
PHOSPHATE SERPL-MCNC: 3.8 MG/DL — SIGNIFICANT CHANGE UP (ref 2.5–4.5)
PLATELET # BLD AUTO: 312 K/UL — SIGNIFICANT CHANGE UP (ref 150–400)
POTASSIUM SERPL-MCNC: 4 MMOL/L — SIGNIFICANT CHANGE UP (ref 3.5–5.3)
POTASSIUM SERPL-SCNC: 4 MMOL/L — SIGNIFICANT CHANGE UP (ref 3.5–5.3)
RBC # BLD: 4.04 M/UL — SIGNIFICANT CHANGE UP (ref 3.8–5.2)
RBC # FLD: 15 % — HIGH (ref 10.3–14.5)
SODIUM SERPL-SCNC: 135 MMOL/L — SIGNIFICANT CHANGE UP (ref 135–145)
WBC # BLD: 11.04 K/UL — HIGH (ref 3.8–10.5)
WBC # FLD AUTO: 11.04 K/UL — HIGH (ref 3.8–10.5)

## 2023-03-15 PROCEDURE — 99291 CRITICAL CARE FIRST HOUR: CPT

## 2023-03-15 PROCEDURE — 99232 SBSQ HOSP IP/OBS MODERATE 35: CPT | Mod: 24

## 2023-03-15 PROCEDURE — 70551 MRI BRAIN STEM W/O DYE: CPT | Mod: 26

## 2023-03-15 PROCEDURE — 76856 US EXAM PELVIC COMPLETE: CPT | Mod: 26

## 2023-03-15 PROCEDURE — XXXXX: CPT | Mod: 1L

## 2023-03-15 RX ORDER — METOCLOPRAMIDE HCL 10 MG
10 TABLET ORAL EVERY 6 HOURS
Refills: 0 | Status: DISCONTINUED | OUTPATIENT
Start: 2023-03-15 | End: 2023-03-16

## 2023-03-15 RX ORDER — DEXAMETHASONE 0.5 MG/5ML
4 ELIXIR ORAL EVERY 6 HOURS
Refills: 0 | Status: COMPLETED | OUTPATIENT
Start: 2023-03-15 | End: 2023-03-16

## 2023-03-15 RX ORDER — DEXAMETHASONE 0.5 MG/5ML
ELIXIR ORAL
Refills: 0 | Status: DISCONTINUED | OUTPATIENT
Start: 2023-03-15 | End: 2023-03-16

## 2023-03-15 RX ORDER — LEVETIRACETAM 250 MG/1
500 TABLET, FILM COATED ORAL EVERY 12 HOURS
Refills: 0 | Status: DISCONTINUED | OUTPATIENT
Start: 2023-03-15 | End: 2023-03-16

## 2023-03-15 RX ORDER — ACETAMINOPHEN 500 MG
1000 TABLET ORAL ONCE
Refills: 0 | Status: COMPLETED | OUTPATIENT
Start: 2023-03-15 | End: 2023-03-15

## 2023-03-15 RX ORDER — SODIUM CHLORIDE 9 MG/ML
1000 INJECTION INTRAMUSCULAR; INTRAVENOUS; SUBCUTANEOUS
Refills: 0 | Status: DISCONTINUED | OUTPATIENT
Start: 2023-03-15 | End: 2023-03-15

## 2023-03-15 RX ORDER — DEXAMETHASONE 0.5 MG/5ML
4 ELIXIR ORAL EVERY 12 HOURS
Refills: 0 | Status: DISCONTINUED | OUTPATIENT
Start: 2023-03-15 | End: 2023-03-15

## 2023-03-15 RX ORDER — DEXAMETHASONE 0.5 MG/5ML
4 ELIXIR ORAL EVERY 6 HOURS
Refills: 0 | Status: DISCONTINUED | OUTPATIENT
Start: 2023-03-14 | End: 2023-03-15

## 2023-03-15 RX ORDER — ONDANSETRON 8 MG/1
4 TABLET, FILM COATED ORAL ONCE
Refills: 0 | Status: COMPLETED | OUTPATIENT
Start: 2023-03-15 | End: 2023-03-15

## 2023-03-15 RX ORDER — SODIUM CHLORIDE 9 MG/ML
1000 INJECTION INTRAMUSCULAR; INTRAVENOUS; SUBCUTANEOUS ONCE
Refills: 0 | Status: COMPLETED | OUTPATIENT
Start: 2023-03-15 | End: 2023-03-15

## 2023-03-15 RX ORDER — SCOPALAMINE 1 MG/3D
1 PATCH, EXTENDED RELEASE TRANSDERMAL ONCE
Refills: 0 | Status: COMPLETED | OUTPATIENT
Start: 2023-03-15 | End: 2023-03-15

## 2023-03-15 RX ORDER — DEXAMETHASONE 0.5 MG/5ML
4 ELIXIR ORAL EVERY 12 HOURS
Refills: 0 | Status: DISCONTINUED | OUTPATIENT
Start: 2023-03-16 | End: 2023-03-16

## 2023-03-15 RX ORDER — POLYETHYLENE GLYCOL 3350 17 G/17G
17 POWDER, FOR SOLUTION ORAL DAILY
Refills: 0 | Status: DISCONTINUED | OUTPATIENT
Start: 2023-03-15 | End: 2023-03-16

## 2023-03-15 RX ORDER — DEXAMETHASONE 0.5 MG/5ML
2 ELIXIR ORAL EVERY 12 HOURS
Refills: 0 | Status: DISCONTINUED | OUTPATIENT
Start: 2023-03-17 | End: 2023-03-16

## 2023-03-15 RX ADMIN — SCOPALAMINE 1 PATCH: 1 PATCH, EXTENDED RELEASE TRANSDERMAL at 02:15

## 2023-03-15 RX ADMIN — Medication 100 MILLIGRAM(S): at 00:11

## 2023-03-15 RX ADMIN — Medication 4 MILLIGRAM(S): at 06:07

## 2023-03-15 RX ADMIN — Medication 1 MILLIGRAM(S): at 11:30

## 2023-03-15 RX ADMIN — IRON SUCROSE 66.25 MILLIGRAM(S): 20 INJECTION, SOLUTION INTRAVENOUS at 21:51

## 2023-03-15 RX ADMIN — SCOPALAMINE 1 PATCH: 1 PATCH, EXTENDED RELEASE TRANSDERMAL at 20:33

## 2023-03-15 RX ADMIN — Medication 10 MILLIGRAM(S): at 03:27

## 2023-03-15 RX ADMIN — Medication 1000 MILLIGRAM(S): at 02:30

## 2023-03-15 RX ADMIN — Medication 100 MILLIGRAM(S): at 12:39

## 2023-03-15 RX ADMIN — SODIUM CHLORIDE 1000 MILLILITER(S): 9 INJECTION INTRAMUSCULAR; INTRAVENOUS; SUBCUTANEOUS at 19:57

## 2023-03-15 RX ADMIN — LEVETIRACETAM 400 MILLIGRAM(S): 250 TABLET, FILM COATED ORAL at 06:00

## 2023-03-15 RX ADMIN — Medication 50 MILLIGRAM(S): at 12:39

## 2023-03-15 RX ADMIN — Medication 4 MILLIGRAM(S): at 12:39

## 2023-03-15 RX ADMIN — Medication 10 MILLIGRAM(S): at 12:40

## 2023-03-15 RX ADMIN — Medication 100 MILLIGRAM(S): at 17:38

## 2023-03-15 RX ADMIN — Medication 4 MILLIGRAM(S): at 17:38

## 2023-03-15 RX ADMIN — SCOPALAMINE 1 PATCH: 1 PATCH, EXTENDED RELEASE TRANSDERMAL at 06:23

## 2023-03-15 RX ADMIN — Medication 10 MILLIGRAM(S): at 17:39

## 2023-03-15 RX ADMIN — LEVETIRACETAM 400 MILLIGRAM(S): 250 TABLET, FILM COATED ORAL at 17:39

## 2023-03-15 RX ADMIN — Medication 400 MILLIGRAM(S): at 02:16

## 2023-03-15 RX ADMIN — POLYETHYLENE GLYCOL 3350 17 GRAM(S): 17 POWDER, FOR SOLUTION ORAL at 12:28

## 2023-03-15 RX ADMIN — Medication 4 MILLIGRAM(S): at 23:44

## 2023-03-15 RX ADMIN — ONDANSETRON 4 MILLIGRAM(S): 8 TABLET, FILM COATED ORAL at 02:16

## 2023-03-15 RX ADMIN — SENNA PLUS 2 TABLET(S): 8.6 TABLET ORAL at 21:53

## 2023-03-15 NOTE — OCCUPATIONAL THERAPY INITIAL EVALUATION ADULT - MODALITIES TREATMENT COMMENTS
Cranial Nerves II - XII: II: PERRLA; visual fields are full to confrontation III, IV, VI: convergence/divergence impaired bilaterally, no nystagmus appreciated V: facial sensation intact to light touch V1-V3 b/l VII: no ptosis, no facial droop, symmetric eyebrow raise and closure VIII: hearing intact to finger rub b/l  XI: head turning and shoulder shrug intact b/l XII: tongue protrusion midline // Pt able to ambulate in room (~30ft) with CGAx1 hand held assist. Pt demo mild unsteadiness and decreased step length, however no acute LOB observed.

## 2023-03-15 NOTE — PROGRESS NOTE ADULT - SUBJECTIVE AND OBJECTIVE BOX
HPI:  17F hx of migraines, asthma, several years of syncopal episodes presented to [facility] on 3/12/23 for a syncopal event. Patient states she was in her kitchen getting ready for work and suddenly felt lightheaded and had cold sweats. She states it was an unwitnessed fall and landed on her face chipping her front tooth. She was brought to [place] and underwent CTH showing moderate obstructive hydrocephalus and CT orbits/facial bones which was negative. She states she had a similar syncopal episode most recently last year and another 3 years ago. She states she was told to see a neurosurgeon for "increased fluid" after an outpatient MRI brain was done but did not follow-up, at this time she also was seen by a cardiologist for syncope work-up that was reportedly negative. She denies any symptoms currently and is accompanied by her father.   (13 Mar 2023 00:03)    OVERNIGHT EVENTS: S/p R ETV and EVD placement, pt examined in NSICU in [facility] on RA.     Hospital Course:   3/12: patient admitted for obstructive hydrocephalus, pending further imaging/workup  3/13: MUNA overnight, pending MRI/MRV. ISS dc'd. s/p lovenox 40 mg x 1. Ativan 1.5 mcg given for MRI CSF flow study and MRV done. ?Aquaductal stenosis pending final read. Possible plan for ETV, to be discussed with patient and family.   3/14: MUNA o/n neuro stable. Pending OR today. S/p R ETV placement and EVD placement. EVD remains clamped @10 cmH20.  Decadron taper over 3 days to off. Post op MRI w/ CSF flow pending.  3/15: POD1 R ETV placement and EVD placement. Iron x 3 days for LALITHA. Post op pt had an episode of emesis and received zofran and reglan.     Vital Signs Last 24 Hrs  T(C): 36.4 (14 Mar 2023 22:02), Max: 37.5 (14 Mar 2023 14:05)  T(F): 97.6 (14 Mar 2023 22:02), Max: 99.5 (14 Mar 2023 14:05)  HR: 105 (15 Mar 2023 00:00) (76 - 119)  BP: 88/50 (15 Mar 2023 00:00) (88/50 - 120/63)  BP(mean): 64 (15 Mar 2023 00:00) (64 - 89)  RR: 17 (15 Mar 2023 00:00) (12 - 58)  SpO2: 96% (15 Mar 2023 00:00) (93% - 100%)    Parameters below as of 15 Mar 2023 00:00  Patient On (Oxygen Delivery Method): room air        I&O's Summary    13 Mar 2023 07:01  -  14 Mar 2023 07:00  --------------------------------------------------------  IN: 600 mL / OUT: 1350 mL / NET: -750 mL    14 Mar 2023 07:01  -  15 Mar 2023 00:32  --------------------------------------------------------  IN: 635 mL / OUT: 785 mL / NET: -150 mL        PHYSICAL EXAM:  General: patient seen laying supine in bed in NAD  Neuro: AAOx3, FC, OE spontaneously, speech hoarse, CNII-XI grossly intact, face symmetric, no pronator drift, strength 5/5 b/l UE and LE  HEENT: PERRL, EOMI  Pulmonary: chest rise symmetric  Abdomen: soft, nontender, nondistended  Ext: perfusing well  Skin: warm, dry  Wound: R Crani incision site c/d/i     TUBES/LINES:  [X] Ji  [] Lumbar Drain  [] Wound Drains  [X] Others - EVD clamped @10 cmH20      DIET:  [] NPO  [x] Mechanical  [] Tube feeds    LABS:                        9.6    6.86  )-----------( 323      ( 14 Mar 2023 20:36 )             30.7     03-14    136  |  106  |  12  ----------------------------<  128<H>  4.0   |  21<L>  |  0.80    Ca    9.2      14 Mar 2023 20:36  Phos  4.2     03-14  Mg     1.7     03-14      PT/INR - ( 14 Mar 2023 20:36 )   PT: 13.9 sec;   INR: 1.17          PTT - ( 14 Mar 2023 20:36 )  PTT:29.1 sec        CAPILLARY BLOOD GLUCOSE          Drug Levels: [] N/A    CSF Analysis: [] N/A      Allergies    No Known Allergies    Intolerances      MEDICATIONS:  Antibiotics:  ceFAZolin   IVPB 2000 milliGRAM(s) IV Intermittent every 8 hours    Neuro:  acetaminophen     Tablet .. 650 milliGRAM(s) Oral every 6 hours PRN  acetaminophen     Tablet .. 650 milliGRAM(s) Oral every 6 hours PRN  levETIRAcetam 500 milliGRAM(s) Oral every 12 hours  metoclopramide Injectable 10 milliGRAM(s) IV Push every 8 hours PRN  ondansetron    Tablet 4 milliGRAM(s) Oral every 6 hours PRN  ondansetron Injectable 4 milliGRAM(s) IV Push every 6 hours PRN  oxyCODONE    IR 5 milliGRAM(s) Oral every 4 hours PRN  oxyCODONE    IR 10 milliGRAM(s) Oral every 4 hours PRN  topiramate 50 milliGRAM(s) Oral daily    Anticoagulation:    OTHER:  albuterol    90 MICROgram(s) HFA Inhaler 2 Puff(s) Inhalation daily PRN  albuterol/ipratropium for Nebulization 3 milliLiter(s) Nebulizer every 6 hours PRN  bisacodyl 5 milliGRAM(s) Oral daily PRN  chlorhexidine 2% Cloths 1 Application(s) Topical daily  dexAMETHasone  Injectable   IV Push   influenza   Vaccine 0.5 milliLiter(s) IntraMuscular once  senna 2 Tablet(s) Oral at bedtime    IVF:  iron sucrose IVPB 300 milliGRAM(s) IV Intermittent every 24 hours  sodium chloride 0.9%. 1000 milliLiter(s) IV Continuous <Continuous>    CULTURES:    RADIOLOGY & ADDITIONAL TESTS:      ASSESSMENT:  17F hx of migraines, asthma, several years of syncopal episodes initially presented to [facility] on 3/12/23 for a syncopal event, CTH showing moderate obstructive hydrocephalus and CT orbits/facial bones which was negative. Transferred to [facility] for further workup.S/p R ETV placement and EVD placement.     HYDROCEPHALUS    Handoff    Migraine headache    Asthma    Hydrocephalus    Obstructive hydrocephalus    Endoscopic third ventriculostomy    SysAdmin_VstLnk        PLAN:  Neuro  - neuro/vital checks q1h  - home dose topamax for headaches  - CTH and CT orbits/facial bones completed at OSH  - MRIw/ CSF flow + 3D T2 /MRV 3/13: obstructive hydro, transependymal flow, aqueductal stenosis, repeat MRI w/ CSF flow pending   - EVD clamped @10 cmH20  - Decadron taper over 3 days to off.     Cardio  - normotenisve    Pulm  - RA  - home dose albuterol 2puffs daily prn    GI  - regular diet  - bowel regimen     Renal  - IVL    :   - pelvic US pending (adnexal mass on OSH imaging)  - gyn consult pending  - Ji    Endo  - ISS    Heme  - Iron x 3 days for LALITHA     ID  - afebrile     DISPO  - NSICU, family updated, full code    D/w Dr. Ross and Dr. Palmer      Assessment:  Present when checked    []  GCS  E   V  M     Heart Failure: []Acute, [] acute on chronic , []chronic  Heart Failure:  [] Diastolic (HFpEF), [] Systolic (HFrEF), []Combined (HFpEF and HFrEF), [] RHF, [] Pulm HTN, [] Other    [] BARBARA, [] ATN, [] AIN, [] other  [] CKD1, [] CKD2, [] CKD 3, [] CKD 4, [] CKD 5, []ESRD    Encephalopathy: [] Metabolic, [] Hepatic, [] toxic, [] Neurological, [] Other    Abnormal Nurtitional Status: [] malnurtition (see nutrition note), [ ]underweight: BMI < 19, [] morbid obesity: BMI >40, [] Cachexia    [] Sepsis  [] hypovolemic shock,[] cardiogenic shock, [] hemorrhagic shock, [] neuogenic shock  [] Acute Respiratory Failure  []Cerebral edema, [] Brain compression/ herniation,   [] Functional quadriplegia  [] Acute blood loss anemia

## 2023-03-15 NOTE — OCCUPATIONAL THERAPY INITIAL EVALUATION ADULT - PERTINENT HX OF CURRENT PROBLEM, REHAB EVAL
17F hx of migraines, asthma, several years of syncopal episodes initially presented to [facility] on 3/12/23 for a syncopal event, CTH showing moderate obstructive hydrocephalus and CT orbits/facial bones which was negative. Transferred to [facility] for further workup.S/p R ETV placement and EVD placement.

## 2023-03-15 NOTE — PHYSICAL THERAPY INITIAL EVALUATION ADULT - SITTING BALANCE: DYNAMIC
CG - pt with one episode of truncal instability with L lateral lean in which pt benefitted from PT assist to regain upright posture

## 2023-03-15 NOTE — OCCUPATIONAL THERAPY INITIAL EVALUATION ADULT - GENERAL OBSERVATIONS, REHAB EVAL
OT IE completed. Orders received, chart reviewed, pt cleared for OT by covering ASTON Alvarado and STEVEN So (nsx). Pt received semi supine in bed, NAD, +heplock, +L a line, +EVD (clamped per covering ASTON Alvarado). Pt A&Ox4, agreeable to OT, and tolerated session well.

## 2023-03-15 NOTE — PROCEDURE NOTE - ADDITIONAL PROCEDURE DETAILS
External ventricular drain removed under sterile protocol. Tip of catheter visualized. 3-0 nylon suture placed at exit site and covered with bacitracin and Medipore.

## 2023-03-15 NOTE — PHYSICAL THERAPY INITIAL EVALUATION ADULT - PERTINENT HX OF CURRENT PROBLEM, REHAB EVAL
17F hx of migraines, asthma, several years of syncopal episodes initially presented to [facility] on 3/12/23 for a syncopal event, CTH showing moderate obstructive hydrocephalus and CT orbits/facial bones which was negative. Transferred to [facility] for further workup.S/p R ETV placement and EVD placement.

## 2023-03-15 NOTE — PHYSICAL THERAPY INITIAL EVALUATION ADULT - GENERAL OBSERVATIONS, REHAB EVAL
PT IE completed. Chart reviewed. Pt received semi-supine, NAD, +EVD (clamped as per ASTON Alvarado covering), +L A line, +B/L SCDs, +tele. ASTON Alvarado (covering) cleared pt for PT.

## 2023-03-15 NOTE — CHART NOTE - NSCHARTNOTEFT_GEN_A_CORE
Postop MRI w/ CSF flow completed with improvement in size of the lateral and third ventricles. EVD removed 3/15. Pelvic ultrasound with findings of  right ovarian cysts, 7.8 and 9.2 cm each gynecology consulted. Neuro exam stable.

## 2023-03-15 NOTE — PHYSICAL THERAPY INITIAL EVALUATION ADULT - THERAPY FREQUENCY, PT EVAL
Patient educated on frequency of inpatient physical therapy at [facility], patient verbalized understanding./3-5x/week

## 2023-03-15 NOTE — PROGRESS NOTE ADULT - TIME BILLING
reviewing pertinent data, performing physical examination, formulating a plan and counseling the patient and the patient's family.
reviewing pertinent data, performing physical examination, formulating a plan and counseling the patient and the patient's family.

## 2023-03-15 NOTE — PHYSICAL THERAPY INITIAL EVALUATION ADULT - ADDITIONAL COMMENTS
Pt reports living in apartment with family, with "a lot" of stairs to enter. Reports being independent with daily activities without use of DME.

## 2023-03-15 NOTE — PROGRESS NOTE ADULT - SUBJECTIVE AND OBJECTIVE BOX
***********************************************  ADULT NSICU PROGRESS NOTE  CLAIRE RICHARDS [id number] [facility] [id number]  ***********************************************    24H INTERVAL EVENTS:  - S/p ETV and EVD placement yesterday evening  - No acute overnight events (c/o nausea)      ROS: negative except per mentioned above in 24h interval events.        ICU Vital Signs Last 24 Hrs  T(C): 37.1 (15 Mar 2023 05:35), Max: 37.5 (14 Mar 2023 14:05)  T(F): 98.8 (15 Mar 2023 05:35), Max: 99.5 (14 Mar 2023 14:05)  HR: 108 (15 Mar 2023 12:15) (86 - 124)  BP: 113/59 (15 Mar 2023 12:15) (88/50 - 124/84)  BP(mean): 82 (15 Mar 2023 12:15) (64 - 99)  ABP: 119/68 (15 Mar 2023 12:15) (90/72 - 142/91)  ABP(mean): 86 (15 Mar 2023 12:15) (63 - 114)  RR: 20 (15 Mar 2023 12:15) (12 - 58)  SpO2: 96% (15 Mar 2023 12:15) (93% - 100%)    O2 Parameters below as of 15 Mar 2023 12:15  Patient On (Oxygen Delivery Method): room air            EXAM:     Salcha Coma Scale: 15    General: normocephalic, EVD in place, laying in bed, in no distress  Neuro     MS: A/Ox3, cooperative, normal attention, no neglect, comprehension intact, speech with preserved fluency, naming, and repetition    CN: PERRL, VF FTC, EOMI and no ptosis bilaterally, sensation intact to crude touch V1-V3, face symmetric, hearing grossly intact    Mot: bulk normal, tone normal, power 5/5 in bilateral upper and lower proximal extremities    Sens: intact to crude touch in bilateral upper and lower extremities    Reflexes: deferred    Coord: no dysmetria or ataxia on finger to nose/heel to shin, respectively, no focal bradykinetic movements    Gait: deferred  Chest: nonlabored respirations, no adventitious lung sounds bilaterally, heart regular rate/rhythm, present S1/S2, no murmurs or rubs  Abdomen: nondistended, soft and nontender without peritoneal signs, normoactive bowel sounds  Extremities: no clubbing, well-perfused, no edema    LABORATORY DATA:                            9.6    11.04 )-----------( 312      ( 15 Mar 2023 04:59 )             30.2     03-15    135  |  104  |  13  ----------------------------<  153<H>  4.0   |  20<L>  |  0.82    Ca    9.4      15 Mar 2023 04:59  Phos  3.8     03-15  Mg     2.5     03-15        MEDICATIONS  (STANDING):  ceFAZolin   IVPB 2000 milliGRAM(s) IV Intermittent every 8 hours  chlorhexidine 2% Cloths 1 Application(s) Topical daily  dexAMETHasone  Injectable   IV Push   dexAMETHasone  Injectable 4 milliGRAM(s) IV Push every 6 hours  influenza   Vaccine 0.5 milliLiter(s) IntraMuscular once  iron sucrose IVPB 300 milliGRAM(s) IV Intermittent every 24 hours  levETIRAcetam  IVPB 500 milliGRAM(s) IV Intermittent every 12 hours  metoclopramide Injectable 10 milliGRAM(s) IV Push every 6 hours  polyethylene glycol 3350 17 Gram(s) Oral daily  senna 2 Tablet(s) Oral at bedtime  sodium chloride 0.9%. 1000 milliLiter(s) (65 mL/Hr) IV Continuous <Continuous>  topiramate 50 milliGRAM(s) Oral daily    MEDICATIONS  (PRN):  acetaminophen     Tablet .. 650 milliGRAM(s) Oral every 6 hours PRN Temp greater or equal to 38C (100.4F), Mild Pain (1 - 3)  albuterol    90 MICROgram(s) HFA Inhaler 2 Puff(s) Inhalation daily PRN Shortness of Breath and/or Wheezing  albuterol/ipratropium for Nebulization 3 milliLiter(s) Nebulizer every 6 hours PRN Shortness of Breath and/or Wheezing  bisacodyl 5 milliGRAM(s) Oral daily PRN Constipation  ondansetron Injectable 4 milliGRAM(s) IV Push every 6 hours PRN Nausea and/or Vomiting  oxyCODONE    IR 5 milliGRAM(s) Oral every 4 hours PRN Moderate Pain (4 - 6)  oxyCODONE    IR 10 milliGRAM(s) Oral every 4 hours PRN Severe Pain (7 - 10)      ***********************************************  ASSESSMENT AND PLAN  ***********************************************      #Aqueductal stenosis/obstructive hydrocephalus  #History of migraine headaches, prior syncopal episodes    NEURO - admit NSICU, Q1h neuro checks / Q1h vital signs, MRI brain w CSF flow study, suggestive of aqueductal stenosis, PT/OT, pain control, home topamax, EVD clamped, monitor ICPs  PULM - SpO2 goal > 92%, supplemental O2 and pulm toileting as needed  CARDIO - BP goal NORMOTENSION  GI - bowel regimen, stool count, diet: NPO UFN  /RENAL - monitor UOP/volume status, BUN/SCr  HEME - maintain Hb > 7.0, PLT > 100,000  ID - monitor for infectious s/s, fever curve, leukocytosis  ENDO - SGlu goal < 200  OB - repeat adnexal US, OB consultation

## 2023-03-15 NOTE — CONSULT NOTE ADULT - ASSESSMENT
16yo with hx of migraines, asthma, and obstructive hydrocephalus POD1 s/p endoscopic third ventriculostomy, with complain of abdominal pain, found to have 7 and 9 cm right simple ovarian cysts. The patient has 2 large simple ovarian cysts, no sonographic evidence  concerning for malignancy. The patient is currently not in severe pain, mild abdominal discomfort. physical exam is benign.  No clinical or sonographic evidence for torsion. No indication for acute gyn intervention. I explained the patient that with such large cysts, she can have torsion in the future, I explained the importance of arriving to the closet ED in case of severe abdominal pain. Since the cysts are simple and the patient is not torsed now, surgery is not recommended at that time as the cysts can resolve spontaneuosly. We will follow up with the patient in 6-8 weeks in the resident clinic for further assessment.   Gyn will sign off, please consult us if severe abdominal pain reoccur or if any other Gyn concern, please consult us again.

## 2023-03-15 NOTE — PHYSICAL THERAPY INITIAL EVALUATION ADULT - SENSORY TESTS
(L) hand  5/5, (R) hand  5/5. CN Testing: B/L Frontalis intact; B/L buccinator intact; smile symmetrical; tongue protrusion at midline; B/L eyes open/close intact; Shoulder elevation: intact bilaterally; Vision H-Test: bilateral tracking and smooth pursuit intact; Convergence/Divergence: impaired; Vision Quadrant Test: intact bilaterally.

## 2023-03-15 NOTE — OCCUPATIONAL THERAPY INITIAL EVALUATION ADULT - DIAGNOSIS, OT EVAL
Pt presenting with impaired balance, decreased strength, and decreased functional activity tolerance, impacting ability to perform functional mobility/ADLs.

## 2023-03-15 NOTE — PHYSICAL THERAPY INITIAL EVALUATION ADULT - NSPTDISCHREC_GEN_A_CORE
Home, no PT needs PENDING stair negotiation assessment // Discussed with STEVEN So (nsx) and NUHA Angel

## 2023-03-15 NOTE — CONSULT NOTE ADULT - SUBJECTIVE AND OBJECTIVE BOX
17F hx of migraines, asthma, and obstructive hydrocephalus POD1 s/p endoscopic third ventriculostomy, with complain of abdominal pain, found to have 7 and 9 cm right simple ovarian cysts.  The patient says that on 3/12 she had acute onset of severe lower abdominal pain (no laterality), she took motrin with no improvement, the pain subsided after several hours. However, since then she endorses mild lower abdominal pain, 3/10, constant, worse on the left side.   Pt denies current nausea, vomiting, vaginal bleeding.      OB/GYN Hx:  Regular periods, LMP 2/20/23. Never sexually active, never on OCP, never saw ObGyn  PMHx: Asthma, migrains, obstructive hydrocephalus, dx following syncopal episode.   SHx: s/p endoscopic third ventriculostomy,  Meds: Topamax and albuterol PRN  Allergies: NKDA    PHYSICAL EXAM:   Vital Signs Last 24 Hrs  T(C): 37.6 (15 Mar 2023 13:52), Max: 37.6 (15 Mar 2023 13:52)  T(F): 99.6 (15 Mar 2023 13:52), Max: 99.6 (15 Mar 2023 13:52)  HR: 93 (15 Mar 2023 16:00) (86 - 124)  BP: 93/51 (15 Mar 2023 16:00) (88/50 - 124/84)  BP(mean): 69 (15 Mar 2023 16:00) (64 - 99)  RR: 16 (15 Mar 2023 16:00) (12 - 21)  SpO2: 97% (15 Mar 2023 16:00) (93% - 100%)    Parameters below as of 15 Mar 2023 16:00  Patient On (Oxygen Delivery Method): room air    Constitutional: Alert & Oriented x3, No acute distress  Respiratory: Comfortable on RA  Gastrointestinal: soft,  mild tenderness at lower abdomen, b/l, soft mass palpated in the suprapubic area, no rebound or guarding   Pelvic exam: deffered  Extremities: no calf tenderness or swelling      LABS:                        9.6    11.04 )-----------( 312      ( 15 Mar 2023 04:59 )             30.2     03-15    135  |  104  |  13  ----------------------------<  153<H>  4.0   |  20<L>  |  0.82    Ca    9.4      15 Mar 2023 04:59  Phos  3.8     03-15  Mg     2.5     03-15      PT/INR - ( 14 Mar 2023 20:36 )   PT: 13.9 sec;   INR: 1.17          PTT - ( 14 Mar 2023 20:36 )  PTT:29.1 sec        RADIOLOGY & ADDITIONAL STUDIES:  TAUS:  Uterus: 8.1 cm x 3.2 cm x 4.1 cm. Within normal limits.  Endometrium: 0.9 cm. Within normal limits.    Right ovary: 7.7 cm x 7.4 cm x 9.4 cm. Two thin walled unilocular cysts,   no suspicious solid tissue, measures 7.8 and 9.2 cm each.    Normal   arterial and venous waveforms.  Left ovary: 3.0 cm x 2.2 cm x 2.5 cm. Within normal limits. Normal   arterial and venous waveforms.    Fluid: None.    IMPRESSION:  Two simple right ovarian cysts, probably functional. Recommend followup   pelvic ultrasound in 6-8 weeks.

## 2023-03-15 NOTE — PROGRESS NOTE ADULT - SUBJECTIVE AND OBJECTIVE BOX
NSCU ATTENDING -- ADDITIONAL PROGRESS NOTE    Nighttime rounds were performed -- please refer to earlier Progress Note for HPI details.      ICU Vital Signs Last 24 Hrs  T(C): 37.6 (15 Mar 2023 17:28), Max: 37.6 (15 Mar 2023 13:52)  T(F): 99.6 (15 Mar 2023 17:28), Max: 99.6 (15 Mar 2023 13:52)  HR: 107 (15 Mar 2023 17:00) (86 - 124)  BP: 94/51 (15 Mar 2023 17:00) (88/50 - 124/84)  BP(mean): 67 (15 Mar 2023 17:00) (64 - 99)  ABP: 115/65 (15 Mar 2023 15:00) (90/72 - 142/91)  ABP(mean): 83 (15 Mar 2023 15:00) (63 - 114)  RR: 16 (15 Mar 2023 17:00) (12 - 21)  SpO2: 98% (15 Mar 2023 17:00) (93% - 100%)      03-14-23 @ 07:01  -  03-15-23 @ 07:00  --------------------------------------------------------  IN: 1540 mL / OUT: 1615 mL / NET: -75 mL    03-15-23 @ 07:01  -  03-15-23 @ 19:04  --------------------------------------------------------  IN: 935 mL / OUT: 1250 mL / NET: -315 mL        NEURO EXAM:  Neurological: Awake, alert, oriented x3, following commands, PERRL 3mm, EOMI   Moving all extremities, power 5/5, sensation intact      MEDICATIONS:   acetaminophen     Tablet .. 650 milliGRAM(s) Oral every 6 hours PRN  albuterol    90 MICROgram(s) HFA Inhaler 2 Puff(s) Inhalation daily PRN  albuterol/ipratropium for Nebulization 3 milliLiter(s) Nebulizer every 6 hours PRN  bisacodyl 5 milliGRAM(s) Oral daily PRN  chlorhexidine 2% Cloths 1 Application(s) Topical daily  dexAMETHasone  Injectable   IV Push   dexAMETHasone  Injectable 4 milliGRAM(s) IV Push every 6 hours  influenza   Vaccine 0.5 milliLiter(s) IntraMuscular once  iron sucrose IVPB 300 milliGRAM(s) IV Intermittent every 24 hours  levETIRAcetam  IVPB 500 milliGRAM(s) IV Intermittent every 12 hours  metoclopramide Injectable 10 milliGRAM(s) IV Push every 6 hours  ondansetron Injectable 4 milliGRAM(s) IV Push every 6 hours PRN  oxyCODONE    IR 5 milliGRAM(s) Oral every 4 hours PRN  oxyCODONE    IR 10 milliGRAM(s) Oral every 4 hours PRN  polyethylene glycol 3350 17 Gram(s) Oral daily  senna 2 Tablet(s) Oral at bedtime  sodium chloride 0.9%. 1000 milliLiter(s) (65 mL/Hr) IV Continuous <Continuous>  topiramate 50 milliGRAM(s) Oral daily    LABS:                      9.6    11.04 )-----------( 312      ( 15 Mar 2023 04:59 )             30.2     03-15    135  |  104  |  13  ----------------------------<  153<H>  4.0   |  20<L>  |  0.82    Ca    9.4      15 Mar 2023 04:59  Phos  3.8     03-15  Mg     2.5     03-15      ASSESSMENT: Hydrocephalus 2/2 aqueductal stenosis. With transependymal flow. POD 0 s/p ETV with EVD placement.    PLAN:   Neuro: Q4 neurochecks.  Post op imaging MRI with CSF flow.  Migraine: Continue topamax per home regimen.   Decadron, Keppra.   Respiratory: Room air  CV: SBP goal 100-140 mmHg   Endocrine: sugar 140-180   DVT ppx: SCDs. Hold chemoppx for now as fresh post op  Microcytic anemia- labs consistent with LALITHA. On IV iron.  Renal: IVF until good PO intake.  Reproductive: R adnexal simple cysts. No further w/u per GYN  GI: Diet as tolerated  ID: Afebrile       Not critically ill

## 2023-03-16 ENCOUNTER — TRANSCRIPTION ENCOUNTER (OUTPATIENT)
Age: 18
End: 2023-03-16

## 2023-03-16 VITALS
RESPIRATION RATE: 17 BRPM | OXYGEN SATURATION: 99 % | DIASTOLIC BLOOD PRESSURE: 60 MMHG | HEART RATE: 80 BPM | SYSTOLIC BLOOD PRESSURE: 101 MMHG

## 2023-03-16 PROBLEM — G43.909 MIGRAINE, UNSPECIFIED, NOT INTRACTABLE, WITHOUT STATUS MIGRAINOSUS: Chronic | Status: ACTIVE | Noted: 2023-03-13

## 2023-03-16 PROBLEM — J45.909 UNSPECIFIED ASTHMA, UNCOMPLICATED: Chronic | Status: ACTIVE | Noted: 2023-03-13

## 2023-03-16 LAB
ANION GAP SERPL CALC-SCNC: 11 MMOL/L — SIGNIFICANT CHANGE UP (ref 5–17)
BUN SERPL-MCNC: 13 MG/DL — SIGNIFICANT CHANGE UP (ref 7–23)
CALCIUM SERPL-MCNC: 9.3 MG/DL — SIGNIFICANT CHANGE UP (ref 8.4–10.5)
CHLORIDE SERPL-SCNC: 109 MMOL/L — HIGH (ref 96–108)
CO2 SERPL-SCNC: 18 MMOL/L — LOW (ref 22–31)
CREAT SERPL-MCNC: 0.73 MG/DL — SIGNIFICANT CHANGE UP (ref 0.5–1.3)
EGFR: 121 ML/MIN/1.73M2 — SIGNIFICANT CHANGE UP
GLUCOSE SERPL-MCNC: 142 MG/DL — HIGH (ref 70–99)
HCT VFR BLD CALC: 29.1 % — LOW (ref 34.5–45)
HGB BLD-MCNC: 9 G/DL — LOW (ref 11.5–15.5)
MAGNESIUM SERPL-MCNC: 2 MG/DL — SIGNIFICANT CHANGE UP (ref 1.6–2.6)
MCHC RBC-ENTMCNC: 23.7 PG — LOW (ref 27–34)
MCHC RBC-ENTMCNC: 30.9 GM/DL — LOW (ref 32–36)
MCV RBC AUTO: 76.8 FL — LOW (ref 80–100)
NRBC # BLD: 0 /100 WBCS — SIGNIFICANT CHANGE UP (ref 0–0)
PHOSPHATE SERPL-MCNC: 2.9 MG/DL — SIGNIFICANT CHANGE UP (ref 2.5–4.5)
PLATELET # BLD AUTO: 347 K/UL — SIGNIFICANT CHANGE UP (ref 150–400)
POTASSIUM SERPL-MCNC: 3.8 MMOL/L — SIGNIFICANT CHANGE UP (ref 3.5–5.3)
POTASSIUM SERPL-SCNC: 3.8 MMOL/L — SIGNIFICANT CHANGE UP (ref 3.5–5.3)
RBC # BLD: 3.79 M/UL — LOW (ref 3.8–5.2)
RBC # FLD: 15.9 % — HIGH (ref 10.3–14.5)
SODIUM SERPL-SCNC: 138 MMOL/L — SIGNIFICANT CHANGE UP (ref 135–145)
WBC # BLD: 16.16 K/UL — HIGH (ref 3.8–10.5)
WBC # FLD AUTO: 16.16 K/UL — HIGH (ref 3.8–10.5)

## 2023-03-16 PROCEDURE — 97530 THERAPEUTIC ACTIVITIES: CPT

## 2023-03-16 PROCEDURE — 83540 ASSAY OF IRON: CPT

## 2023-03-16 PROCEDURE — A9585: CPT

## 2023-03-16 PROCEDURE — 80048 BASIC METABOLIC PNL TOTAL CA: CPT

## 2023-03-16 PROCEDURE — 83550 IRON BINDING TEST: CPT

## 2023-03-16 PROCEDURE — C1729: CPT

## 2023-03-16 PROCEDURE — C1889: CPT

## 2023-03-16 PROCEDURE — C1757: CPT

## 2023-03-16 PROCEDURE — 86901 BLOOD TYPING SEROLOGIC RH(D): CPT

## 2023-03-16 PROCEDURE — 85045 AUTOMATED RETICULOCYTE COUNT: CPT

## 2023-03-16 PROCEDURE — 97116 GAIT TRAINING THERAPY: CPT

## 2023-03-16 PROCEDURE — 83036 HEMOGLOBIN GLYCOSYLATED A1C: CPT

## 2023-03-16 PROCEDURE — 83735 ASSAY OF MAGNESIUM: CPT

## 2023-03-16 PROCEDURE — 94640 AIRWAY INHALATION TREATMENT: CPT

## 2023-03-16 PROCEDURE — 70551 MRI BRAIN STEM W/O DYE: CPT

## 2023-03-16 PROCEDURE — 86900 BLOOD TYPING SEROLOGIC ABO: CPT

## 2023-03-16 PROCEDURE — 84702 CHORIONIC GONADOTROPIN TEST: CPT

## 2023-03-16 PROCEDURE — 87635 SARS-COV-2 COVID-19 AMP PRB: CPT

## 2023-03-16 PROCEDURE — 86850 RBC ANTIBODY SCREEN: CPT

## 2023-03-16 PROCEDURE — 85610 PROTHROMBIN TIME: CPT

## 2023-03-16 PROCEDURE — 85027 COMPLETE CBC AUTOMATED: CPT

## 2023-03-16 PROCEDURE — 70544 MR ANGIOGRAPHY HEAD W/O DYE: CPT

## 2023-03-16 PROCEDURE — 97535 SELF CARE MNGMENT TRAINING: CPT

## 2023-03-16 PROCEDURE — 97165 OT EVAL LOW COMPLEX 30 MIN: CPT

## 2023-03-16 PROCEDURE — 70553 MRI BRAIN STEM W/O & W/DYE: CPT

## 2023-03-16 PROCEDURE — 84466 ASSAY OF TRANSFERRIN: CPT

## 2023-03-16 PROCEDURE — 36415 COLL VENOUS BLD VENIPUNCTURE: CPT

## 2023-03-16 PROCEDURE — 83010 ASSAY OF HAPTOGLOBIN QUANT: CPT

## 2023-03-16 PROCEDURE — C1713: CPT

## 2023-03-16 PROCEDURE — 83615 LACTATE (LD) (LDH) ENZYME: CPT

## 2023-03-16 PROCEDURE — 82728 ASSAY OF FERRITIN: CPT

## 2023-03-16 PROCEDURE — 85730 THROMBOPLASTIN TIME PARTIAL: CPT

## 2023-03-16 PROCEDURE — C9399: CPT

## 2023-03-16 PROCEDURE — 76856 US EXAM PELVIC COMPLETE: CPT

## 2023-03-16 PROCEDURE — 84100 ASSAY OF PHOSPHORUS: CPT

## 2023-03-16 PROCEDURE — 80053 COMPREHEN METABOLIC PANEL: CPT

## 2023-03-16 PROCEDURE — 82962 GLUCOSE BLOOD TEST: CPT

## 2023-03-16 PROCEDURE — 97161 PT EVAL LOW COMPLEX 20 MIN: CPT

## 2023-03-16 RX ORDER — DEXAMETHASONE 0.5 MG/5ML
2 ELIXIR ORAL
Qty: 4 | Refills: 0
Start: 2023-03-16 | End: 2023-03-17

## 2023-03-16 RX ORDER — FERROUS SULFATE 325(65) MG
1 TABLET ORAL
Qty: 14 | Refills: 0
Start: 2023-03-16 | End: 2023-03-29

## 2023-03-16 RX ORDER — ACETAMINOPHEN 500 MG
2 TABLET ORAL
Qty: 0 | Refills: 0 | DISCHARGE
Start: 2023-03-16

## 2023-03-16 RX ORDER — BACITRACIN ZINC 500 UNIT/G
1 OINTMENT IN PACKET (EA) TOPICAL
Qty: 0 | Refills: 0 | DISCHARGE
Start: 2023-03-16

## 2023-03-16 RX ORDER — BACITRACIN ZINC 500 UNIT/G
1 OINTMENT IN PACKET (EA) TOPICAL THREE TIMES A DAY
Refills: 0 | Status: DISCONTINUED | OUTPATIENT
Start: 2023-03-16 | End: 2023-03-16

## 2023-03-16 RX ORDER — POLYETHYLENE GLYCOL 3350 17 G/17G
17 POWDER, FOR SOLUTION ORAL
Qty: 238 | Refills: 0
Start: 2023-03-16 | End: 2023-03-29

## 2023-03-16 RX ORDER — LEVETIRACETAM 250 MG/1
1 TABLET, FILM COATED ORAL
Qty: 10 | Refills: 0
Start: 2023-03-16 | End: 2023-03-20

## 2023-03-16 RX ORDER — PANTOPRAZOLE SODIUM 20 MG/1
1 TABLET, DELAYED RELEASE ORAL
Qty: 2 | Refills: 0
Start: 2023-03-16 | End: 2023-03-17

## 2023-03-16 RX ORDER — OXYCODONE HYDROCHLORIDE 5 MG/1
1 TABLET ORAL
Qty: 20 | Refills: 0
Start: 2023-03-16 | End: 2023-03-20

## 2023-03-16 RX ADMIN — Medication 4 MILLIGRAM(S): at 06:12

## 2023-03-16 RX ADMIN — SCOPALAMINE 1 PATCH: 1 PATCH, EXTENDED RELEASE TRANSDERMAL at 08:10

## 2023-03-16 RX ADMIN — LEVETIRACETAM 400 MILLIGRAM(S): 250 TABLET, FILM COATED ORAL at 06:12

## 2023-03-16 RX ADMIN — Medication 650 MILLIGRAM(S): at 12:45

## 2023-03-16 RX ADMIN — Medication 50 MILLIGRAM(S): at 13:03

## 2023-03-16 RX ADMIN — Medication 650 MILLIGRAM(S): at 11:55

## 2023-03-16 RX ADMIN — CHLORHEXIDINE GLUCONATE 1 APPLICATION(S): 213 SOLUTION TOPICAL at 13:03

## 2023-03-16 RX ADMIN — Medication 1 APPLICATION(S): at 08:52

## 2023-03-16 RX ADMIN — CHLORHEXIDINE GLUCONATE 1 APPLICATION(S): 213 SOLUTION TOPICAL at 06:13

## 2023-03-16 RX ADMIN — POLYETHYLENE GLYCOL 3350 17 GRAM(S): 17 POWDER, FOR SOLUTION ORAL at 11:51

## 2023-03-16 NOTE — DISCHARGE NOTE PROVIDER - CARE PROVIDER_API CALL
Joselyn Barbour)  Neurosurgery  [address] [address]  Phone: [phone]  Fax: [phone]  Follow Up Time:    Joselyn Barbour)  Neurosurgery  [address] [address]  Phone: [phone]  Fax: [phone]  Follow Up Time:     Danis Garza)  Obstetrics and Gynecology  [address] [place] [address]  Phone: [phone]  Fax: [phone]  Follow Up Time:

## 2023-03-16 NOTE — DISCHARGE NOTE PROVIDER - CARE PROVIDERS DIRECT ADDRESSES
,DirectAddress_Unknown ,DirectAddress_Unknown,[email or web address]

## 2023-03-16 NOTE — PROGRESS NOTE ADULT - REASON FOR ADMISSION
Obstructive hydrocephalus on CTH

## 2023-03-16 NOTE — PROGRESS NOTE ADULT - SUBJECTIVE AND OBJECTIVE BOX
HPI:  17F hx of migraines, asthma, several years of syncopal episodes presented to [facility] on 3/12/23 for a syncopal event. Patient states she was in her kitchen getting ready for work and suddenly felt lightheaded and had cold sweats. She states it was an unwitnessed fall and landed on her face chipping her front tooth. She was brought to [place] and underwent CTH showing moderate obstructive hydrocephalus and CT orbits/facial bones which was negative. She states she had a similar syncopal episode most recently last year and another 3 years ago. She states she was told to see a neurosurgeon for "increased fluid" after an outpatient MRI brain was done but did not follow-up, at this time she also was seen by a cardiologist for syncope work-up that was reportedly negative. She denies any symptoms currently and is accompanied by her father.   (13 Mar 2023 00:03)    OVERNIGHT EVENTS: MUNA ovn, neuro/vitals stable    Hospital course:   3/12: patient admitted for obstructive hydrocephalus, pending further imaging/workup  3/13: MUNA overnight, pending MRI/MRV. ISS dc'd. s/p lovenox 40 mg x 1. Ativan 1.5 mcg given for MRI CSF flow study and MRV done. ?Aquaductal stenosis pending final read. Possible plan for ETV, to be discussed with patient and family.   3/14: MUNA o/n neuro stable. Pending OR today. S/p R ETV placement and EVD placement. EVD remains clamped @10 cmH20.  Decadron taper over 3 days to off. Post op MRI w/ CSF flow pending.  3/15: POD1 R ETV placement and EVD placement. Iron x 3 days for LALITHA. Post op pt had an episode of emesis and received zofran and reglan. Reglan standing for contiunued episodes of emesis.   Postop MRI w/ CSF flow completed with improvement in size of the lateral and third ventricles. EVD removed 3/15. Pelvic ultrasound with findings of  right ovarian cysts, 7.8 and 9.2 cm each gynecology consulted.  3/16: POD 2 R ETV. MUNA overnight        Vital Signs Last 24 Hrs  T(C): 37.4 (15 Mar 2023 22:44), Max: 37.7 (15 Mar 2023 22:05)  T(F): 99.3 (15 Mar 2023 22:44), Max: 99.8 (15 Mar 2023 22:05)  HR: 88 (15 Mar 2023 23:42) (88 - 124)  BP: 99/62 (15 Mar 2023 23:42) (88/49 - 124/84)  BP(mean): 75 (15 Mar 2023 23:42) (63 - 99)  RR: 17 (15 Mar 2023 23:42) (16 - 22)  SpO2: 95% (15 Mar 2023 23:42) (95% - 98%)    Parameters below as of 15 Mar 2023 23:42  Patient On (Oxygen Delivery Method): room air        I&O's Summary    14 Mar 2023 07:01  -  15 Mar 2023 07:00  --------------------------------------------------------  IN: 1540 mL / OUT: 1615 mL / NET: -75 mL    15 Mar 2023 07:01  -  16 Mar 2023 01:22  --------------------------------------------------------  IN: 2445 mL / OUT: 1800 mL / NET: 645 mL        PHYSICAL EXAM:  General: patient seen laying supine in bed in NAD  Neuro: AAOx3, FC, OE spontaneously, speech hoarse, CNII-XI grossly intact, face symmetric, no pronator drift, strength 5/5 b/l UE and LE  HEENT: PERRL, EOMI  Pulmonary: chest rise symmetric  Abdomen: soft, nontender, nondistended  Ext: perfusing well  Skin: warm, dry  Wound: R Crani incision site c/d/i    TUBES/LINES:  [] Ji  [] A-line  [] Lumbar Drain  [] Wound Drains  [] NGT   [] EVD   [] CVC  [] Other      DIET:  [] NPO  [x] Mechanical  [] Tube feeds    LABS:                        9.6    11.04 )-----------( 312      ( 15 Mar 2023 04:59 )             30.2     03-15    135  |  104  |  13  ----------------------------<  153<H>  4.0   |  20<L>  |  0.82    Ca    9.4      15 Mar 2023 04:59  Phos  3.8     03-15  Mg     2.5     03-15      PT/INR - ( 14 Mar 2023 20:36 )   PT: 13.9 sec;   INR: 1.17          PTT - ( 14 Mar 2023 20:36 )  PTT:29.1 sec        CAPILLARY BLOOD GLUCOSE          Drug Levels: [] N/A    CSF Analysis: [] N/A      Allergies    No Known Allergies    Intolerances        Home Medications:  albuterol 0.63 mg/3 mL (0.021%) inhalation solution: 3 milliliter(s) inhaled 2 times a day (13 Mar 2023 00:05)  Topamax 50 mg oral tablet: 1 tab(s) orally once a day for migraine headaches (13 Mar 2023 00:05)      MEDICATIONS:  MEDICATIONS  (STANDING):  chlorhexidine 2% Cloths 1 Application(s) Topical daily  dexAMETHasone  Injectable   IV Push   dexAMETHasone  Injectable 4 milliGRAM(s) IV Push every 6 hours  dexAMETHasone  Injectable 4 milliGRAM(s) IV Push every 12 hours  influenza   Vaccine 0.5 milliLiter(s) IntraMuscular once  iron sucrose IVPB 300 milliGRAM(s) IV Intermittent every 24 hours  levETIRAcetam  IVPB 500 milliGRAM(s) IV Intermittent every 12 hours  metoclopramide Injectable 10 milliGRAM(s) IV Push every 6 hours  polyethylene glycol 3350 17 Gram(s) Oral daily  senna 2 Tablet(s) Oral at bedtime  topiramate 50 milliGRAM(s) Oral daily    MEDICATIONS  (PRN):  acetaminophen     Tablet .. 650 milliGRAM(s) Oral every 6 hours PRN Temp greater or equal to 38C (100.4F), Mild Pain (1 - 3)  albuterol    90 MICROgram(s) HFA Inhaler 2 Puff(s) Inhalation daily PRN Shortness of Breath and/or Wheezing  albuterol/ipratropium for Nebulization 3 milliLiter(s) Nebulizer every 6 hours PRN Shortness of Breath and/or Wheezing  bisacodyl 5 milliGRAM(s) Oral daily PRN Constipation  ondansetron Injectable 4 milliGRAM(s) IV Push every 6 hours PRN Nausea and/or Vomiting  oxyCODONE    IR 5 milliGRAM(s) Oral every 4 hours PRN Moderate Pain (4 - 6)  oxyCODONE    IR 10 milliGRAM(s) Oral every 4 hours PRN Severe Pain (7 - 10)      CULTURES:      RADIOLOGY & ADDITIONAL TESTS:      ASSESSMENT:  16 y/o female with h/o of migraines, asthma, several years of syncopal episodes initially presented to [facility] on 3/12/23 for a syncopal event, CTH showing moderate obstructive hydrocephalus and CT orbits/facial bones which was negative. Transferred to [facility] for further workup. S/p ETV placement and EVD placement (3/14/23).     Neuro  - neuro/vital checks q1h  - home dose topamax for headaches  - CTH and CT orbits/facial bones completed at OSH   - MRIw/ CSF flow + 3D T2 /MRV 3/13: obstructive hydro, transependymal flow, aqueductal stenosis  - postop MRI w/ CSF flow completed: improved size of the lateral and third ventricles  - EVD dc'd 3/15  - Decadron taper over 3 days to off     Cardio  - normotenisve  - qtc 431 3/15    Pulm  - RA  - home dose albuterol 2 puffs daily prn    GI  - regular diet  - bowel regimen     Renal  - IVL    :   - Pelvic US: right ovarian cysts, 7.8 and 9.2 cm each  - Gyn consult pending  - Voiding     Endo  - ISS dced, A1c 5.4     Heme  - Iron x 3 days for LALITHA   - Monitor H/H     ID  - afebrile     DISPO  - NSICU, family updated, full code    D/w Dr. Ross and Dr. Whalen     Assessment: present when checked     [] GCS   E   V   M     Heart Failure: [] Acute, [] acute on chronic, [] chronic   Heart Failure: [] Diastolic (HFpEF), [] Systolic (HRrEF), [] Combined (HFpEF and HFrEF), [] RHF, [] Pulm HTN, [] Other     [] BARBARA, [] ATN, [] AIN, [] other   [] CKD1, [] CKD2, [] CKD3, [] CKD4, [] CKD5, [] ESRD     Encephalopathy: [] Metabolic, [] Hepatic, [] Toxic, [] Neurological, [] Other     Abnormal Nutritional Status: [] malnutrition (see nutrition note), []underweight: BMI <19, [] morbid obesity: BMI >40, [] Cachexia     [] Sepsis   [] Hypovolemic shock, [] Cardiogenic shock, [] Hemorrhagic shock, [] Neurogenic shock   [] Acute respiratory failure   [] Cerebral edema, [] Brain compression / herniation   [] Functional quadriplegia   [] Acute blood loss anemia

## 2023-03-16 NOTE — DISCHARGE NOTE NURSING/CASE MANAGEMENT/SOCIAL WORK - FLU SEASON?
"Spoke with Eduardo Martinez, patient asked ""what\"" surgery? He said that 2 options were talked about- I said this is a gastroc recession, incision in the calf muscle - patient said, \""oh, ok- wasn't sure if he was including the toe amputation\""  I asked if he wanted to discuss this further with Dr. Rachel Delgadillo, he said \""no, we're good\"" \"" I just wasn't sure which one we were going with\""    Patient elected 2/9 for surgery- will call pcp for preop, and will call to schedule covid test on 2/7 before noon. Patient had no further questions.   " Yes...

## 2023-03-16 NOTE — DISCHARGE NOTE NURSING/CASE MANAGEMENT/SOCIAL WORK - NSDCPEFALRISK_GEN_ALL_CORE
For information on Fall & Injury Prevention, visit: [email or web address] OR  [email or web address] OR  https://www.cdc.gov/steadi/patient.html

## 2023-03-16 NOTE — DISCHARGE NOTE PROVIDER - NSDCFUSCHEDAPPT_GEN_ALL_CORE_FT
Joselyn Ross  [facility]  NEUROSURG [address]  Scheduled Appointment: 03/21/2023

## 2023-03-16 NOTE — DISCHARGE NOTE PROVIDER - NSDCCPCAREPLAN_GEN_ALL_CORE_FT
PRINCIPAL DISCHARGE DIAGNOSIS  Diagnosis: Obstructive hydrocephalus  Assessment and Plan of Treatment: s/p ETV      SECONDARY DISCHARGE DIAGNOSES  Diagnosis: Anemia  Assessment and Plan of Treatment:     Diagnosis: Ovarian cyst  Assessment and Plan of Treatment:      PRINCIPAL DISCHARGE DIAGNOSIS  Diagnosis: Obstructive hydrocephalus  Assessment and Plan of Treatment: s/p ETV      SECONDARY DISCHARGE DIAGNOSES  Diagnosis: Anemia  Assessment and Plan of Treatment: continue iron supplement and follow up with your PCP    Diagnosis: Ovarian cyst  Assessment and Plan of Treatment: follow up with gynecology in 6-8 weeks

## 2023-03-16 NOTE — DISCHARGE NOTE NURSING/CASE MANAGEMENT/SOCIAL WORK - PATIENT PORTAL LINK FT
You can access the FollowMyHealth Patient Portal offered by [facility] by registering at the following website: [email or web address]. By joining [facility]’s FollowMyHealth portal, you will also be able to view your health information using other applications (apps) compatible with our system.

## 2023-03-16 NOTE — DISCHARGE NOTE PROVIDER - HOSPITAL COURSE
HPI:  17F hx of migraines, asthma, several years of syncopal episodes presented to [facility] on 3/12/23 for a syncopal event. Patient states she was in her kitchen getting ready for work and suddenly felt lightheaded and had cold sweats. She states it was an unwitnessed fall and landed on her face chipping her front tooth. She was brought to [place] and underwent CTH showing moderate obstructive hydrocephalus and CT orbits/facial bones which was negative. She states she had a similar syncopal episode most recently last year and another 3 years ago. She states she was told to see a neurosurgeon for "increased fluid" after an outpatient MRI brain was done but did not follow-up, at this time she also was seen by a cardiologist for syncope work-up that was reportedly negative. She denies any symptoms currently and is accompanied by her father.     Hospital Course:  3/12: patient admitted for obstructive hydrocephalus, pending further imaging/workup  3/13: MUNA overnight, pending MRI/MRV. ISS dc'd. s/p lovenox 40 mg x 1. Ativan 1.5 mcg given for MRI CSF flow study and MRV done. ?Aquaductal stenosis pending final read. Possible plan for ETV, to be discussed with patient and family.   3/14: MUNA o/n neuro stable. Pending OR today. S/p R ETV placement and EVD placement. EVD remains clamped @10 cmH20.  Decadron taper over 3 days to off. Post op MRI w/ CSF flow pending.  3/15: POD1 R ETV placement and EVD placement. Iron x 3 days for LALITHA. Post op pt had an episode of emesis and received zofran and reglan. Reglan standing for contiunued episodes of emesis.   Postop MRI w/ CSF flow completed with improvement in size of the lateral and third ventricles. EVD removed 3/15. Pelvic ultrasound with findings of  right ovarian cysts, 7.8 and 9.2 cm each gynecology consulted.  3/16: POD 2 R ETV. MUNA overnight    Patient evaluated by PT/OT who recommended: Home with no needs  Patient is going home    Hospital course uncomplicated      Exam on day of discharge:    Checklist:   - Obtained follow up appointment from NP  - Reviewed final recommendations of inpatient consults  - review discharge planning on provider handoff  - post op imaging completed  - Neurologically stable for discharge  - Vitals stable for discharge   - Afebrile for discharge  - WBC is stable  - Sodium level is normal  - Pain is adequately controlled  - Pt has PICC/walker/brace/collar   - LACE score (10 or > needs PCP apt)      HPI:  17F hx of migraines, asthma, several years of syncopal episodes presented to [facility] on 3/12/23 for a syncopal event. Patient states she was in her kitchen getting ready for work and suddenly felt lightheaded and had cold sweats. She states it was an unwitnessed fall and landed on her face chipping her front tooth. She was brought to [place] and underwent CTH showing moderate obstructive hydrocephalus and CT orbits/facial bones which was negative. She states she had a similar syncopal episode most recently last year and another 3 years ago. She states she was told to see a neurosurgeon for "increased fluid" after an outpatient MRI brain was done but did not follow-up, at this time she also was seen by a cardiologist for syncope work-up that was reportedly negative. She denies any symptoms currently and is accompanied by her father.     Hospital Course:  3/12: patient admitted for obstructive hydrocephalus, pending further imaging/workup  3/13: MUNA overnight, pending MRI/MRV. ISS dc'd. s/p lovenox 40 mg x 1. Ativan 1.5 mcg given for MRI CSF flow study and MRV done. ?Aquaductal stenosis pending final read. Possible plan for ETV, to be discussed with patient and family.   3/14: MUNA o/n neuro stable. Pending OR today. S/p R ETV placement and EVD placement. EVD remains clamped @10 cmH20.  Decadron taper over 3 days to off. Post op MRI w/ CSF flow pending.  3/15: POD1 R ETV placement and EVD placement. Iron x 3 days for LALITHA. Post op pt had an episode of emesis and received zofran and reglan. Reglan standing for contiunued episodes of emesis.   Postop MRI w/ CSF flow completed with improvement in size of the lateral and third ventricles. EVD removed 3/15. Pelvic ultrasound with findings of  right ovarian cysts, 7.8 and 9.2 cm each gynecology consulted.  3/16: POD 2 R ETV. MUNA overnight    Patient evaluated by PT/OT who recommended: Home with no needs  Patient is going home    Hospital course uncomplicated      Exam on day of discharge:  General: patient seen laying supine in bed in NAD  Neuro: AAOx3, FC, OE spontaneously, speech hoarse, CNII-XI grossly intact, face symmetric, no pronator drift, strength 5/5 b/l UE and LE  HEENT: PERRL, EOMI  Pulmonary: chest rise symmetric  Abdomen: soft, nontender, nondistended  Ext: perfusing well  Skin: warm, dry  Wound: R Crani incision site c/d/i    Patient is neuro stable, vitals stable, afebrile, medically ready for discharge

## 2023-03-16 NOTE — DISCHARGE NOTE PROVIDER - NSDCMRMEDTOKEN_GEN_ALL_CORE_FT
albuterol 0.63 mg/3 mL (0.021%) inhalation solution: 3 milliliter(s) inhaled 2 times a day  Topamax 50 mg oral tablet: 1 tab(s) orally once a day for migraine headaches   acetaminophen 325 mg oral tablet: 2 tab(s) orally every 6 hours, As needed, Temp greater or equal to 38C (100.4F), Mild Pain (1 - 3)  albuterol 0.63 mg/3 mL (0.021%) inhalation solution: 3 milliliter(s) inhaled 2 times a day  bacitracin 500 units/g topical ointment: 1 application topically 3 times a day (3/16 - 3/18)  dexamethasone 2 mg oral tablet: TAPER:     2 tab(s) orally 2 times a day (3/16)  1 tab twice daily (3/17)  ferrous sulfate 200 mg (65 mg elemental iron) oral tablet: 1 tab(s) orally once a day   Keppra 500 mg oral tablet: 1 tab(s) orally 2 times a day   oxyCODONE 5 mg oral tablet: 1 tab(s) orally every 6 hours, As Needed -Moderate Pain (4 - 6) - for severe pain MDD:4 tabs  polyethylene glycol 3350 oral powder for reconstitution: 17 gram(s) orally once a day, As Needed -for constipation   Protonix 40 mg oral delayed release tablet: 1 tab(s) orally once a day   Topamax 50 mg oral tablet: 1 tab(s) orally once a day for migraine headaches

## 2023-03-16 NOTE — DISCHARGE NOTE PROVIDER - NSDCFUADDINST_GEN_ALL_CORE_FT
Neurosurgery follow up appointment date/time:  - are staples/sutures in place?  - what day should staples/sutures be removed (POD 10-14)?  - please call the office to confirm appointment: [phone]    Wound Care:  - shower and wash hair daily  - pat dry incision after showering  - leave incision uncovered, open to air     Devices/Drains/Lines:  -     Activity:  - fatigue is common after surgery, rest if you feel tired   - no bending, lifting, twisting or heavy lifting   - walking is recommended, ambulate as tolerated  - you may shower when you get home, keep your incision dry  - no bathing   - no driving within 24 hours of anesthesia or while taking prescription pain medications   - keep hydrated, drink plenty of water     Inpatient consults:  - Gynecology     Please also follow up with your primary care doctor.     Pain Expectations:  - pain after surgery is expected  - please take pain meds as prescribed     Medications:  - changes to home meds (ex. AED's)?  - new meds?  - pain meds?  - when can antiplatelets or anticoagulants be restarted?  - were adverse affects of meds discussed with patients?   - pain medications can cause constipation, you should eat a high fiber diet and may take a stool softener while on pain meds   - Avoid taking Advil (ibuprofen), Motrin (naproxen), or Aspirin for pain as they can cause bleeding     Call the office or come to ED if:  - wound has drainage or bleeding, increased redness or pain at incision site, neurological change, fever (>101), chills, night sweats, syncope, nausea/vomiting      Playback:  - See [employer] health for a copy of your discharge paperwork     WITHIN 24 HOURS OF DISCHARGE, PLEASE CONTACT NEURO PA  WITH ANY QUESTIONS OR CONCERNS: [phone]   OTHERWISE, PLEASE CALL THE OFFICE WITH ANY QUESTIONS OR CONCERNS: [phone] Neurosurgery follow up appointment date/time: 3/21/23 at 2:30PM  - Follow up in the office for a suture removal   - please call the office to confirm appointment: [phone]    Wound Care:  - shower and wash hair daily  - pat dry incision after showering  - leave incision uncovered, open to air   - bacitracin to wound for next 3 days (3/16 - 3/18)    Activity:  - fatigue is common after surgery, rest if you feel tired   - no bending, lifting, twisting or heavy lifting   - walking is recommended, ambulate as tolerated  - you may shower when you get home, keep your incision dry  - no bathing   - no driving within 24 hours of anesthesia or while taking prescription pain medications   - keep hydrated, drink plenty of water     Inpatient consults:  - Gynecology: Follow up in 6-8 weeks for another pelvis ultrasound     Please also follow up with your primary care doctor.     Pain Expectations:  - pain after surgery is expected  - please take pain meds as prescribed     Medications:  - continue home meds as prescribed   - new meds: Decadron taper for inflammation (last day = 3/17), Keppra for seizure prevention (last day = 3/21), Protonix while on steroid for GI protection, Ferrous Sulfate for anemia   - pain meds: Tylenol as needed for mild to moderate pain, Oxycodone 5mg every 6 hours as needed for severe pain   - pain medications can cause constipation, you should eat a high fiber diet and may take a stool softener while on pain meds   - Avoid taking Advil (ibuprofen), Motrin (naproxen), or Aspirin for pain as they can cause bleeding     Call the office or come to ED if:  - wound has drainage or bleeding, increased redness or pain at incision site, neurological change, fever (>101), chills, night sweats, syncope, nausea/vomiting      Playback:  - See [facility] health for a copy of your discharge paperwork     WITHIN 24 HOURS OF DISCHARGE, PLEASE CONTACT NEURO PA  WITH ANY QUESTIONS OR CONCERNS: [phone]   OTHERWISE, PLEASE CALL THE OFFICE WITH ANY QUESTIONS OR CONCERNS: [phone]

## 2023-03-16 NOTE — DISCHARGE NOTE NURSING/CASE MANAGEMENT/SOCIAL WORK - NSDCFUADDAPPT_GEN_ALL_CORE_FT
Please follow up with Dr. Peter Mcneill on 3/21/23 at 2:30PM, call the office to confirm appointment at [phone]    Please follow up with Gynecology for management of ovarian cyst in 6-8 weeks    Please call [phone] for help with finding a new primary care doctor

## 2023-03-16 NOTE — DISCHARGE NOTE PROVIDER - NSDCFUADDAPPT_GEN_ALL_CORE_FT
Please follow up with Dr. Peter Mcneill    Please follow up with Gynecology for management of Ovarian Cyst    Please follow up with your primary care doctor  Please follow up with Dr. Peter Mcneill on 3/21/23 at 2:30PM, call the office to confirm appointment at [phone]    Please follow up with Gynecology for management of ovarian cyst in 6-8 weeks    Please follow up with your primary care doctor  Please follow up with Dr. Peter Mcneill on 3/21/23 at 2:30PM, call the office to confirm appointment at [phone]    Please follow up with Gynecology for management of ovarian cyst in 6-8 weeks    Please call [phone] for help with finding a new primary care doctor

## 2023-03-16 NOTE — DISCHARGE NOTE PROVIDER - PROVIDER TOKENS
PROVIDER:[TOKEN:[[id number]]] PROVIDER:[TOKEN:[[id number]]],PROVIDER:[TOKEN:[[id number]]]

## 2023-03-21 ENCOUNTER — APPOINTMENT (OUTPATIENT)
Dept: NEUROSURGERY | Facility: CLINIC | Age: 18
End: 2023-03-21
Payer: MEDICAID

## 2023-03-21 VITALS
HEIGHT: 60 IN | RESPIRATION RATE: 16 BRPM | SYSTOLIC BLOOD PRESSURE: 103 MMHG | HEART RATE: 97 BPM | OXYGEN SATURATION: 97 % | WEIGHT: 115 LBS | TEMPERATURE: 98 F | DIASTOLIC BLOOD PRESSURE: 68 MMHG | BODY MASS INDEX: 22.58 KG/M2

## 2023-03-21 PROCEDURE — 99024 POSTOP FOLLOW-UP VISIT: CPT

## 2023-03-21 RX ORDER — LEVETIRACETAM 500 MG/1
500 TABLET, FILM COATED ORAL
Refills: 0 | Status: DISCONTINUED | COMMUNITY
End: 2023-03-21

## 2023-03-21 NOTE — DATA REVIEWED
[de-identified] : \par \par \par \par ACC: [id number] EXAM: MR BRAIN W CSF FLOW ORDERED BY: JOSE TERAN\par \par PROCEDURE DATE: 03/15/2023\par \par \par \par INTERPRETATION: PROCEDURE: MRI Brain without contrast\par \par INDICATION: Postop\par \par TECHNIQUE: Multiplanar multisequence imaging of the brain is obtained without contrast. CSF flow study using 2-D velocity encoded sequences were also obtained.\par \par COMPARISON: 03/13/2013\par \par FINDINGS:\par \par There has been interval or a parietal craniotomy with EVD in place in the right lateral ventricle. The lateral and third ventricles are mildly decreased in size status post endoscopic third ventriculostomy. Configuration of the cerebral aqueduct is consistent with aqueductal stenosis or web on series 301, image 85. Midbrain tegmen is thinned and convex posterior, unchanged. Suprasellar flow jet seen on the T2 series 801, image 12 and shown nicely on the CSF flow study series 501, image 26.\par \par Diffusion imaging is negative for recent infarct. Susceptibility images show minimal hemorrhage in the occipital horns. No parenchymal focus of hemorrhage, and T2-FLAIR imaging is unchanged aside from expected thin signal along the EVD course Small right frontal pneumocephalus. No extra-axial hemorrhage collection or midline shift.\par \par Mastoids and paranasal sinuses appear intact. Skull base and orbits demonstrate findings of chronically elevated intracranial pressure with enlarged sella and dilated optic nerve sheaths, respectively. There is diffuse right scalp swelling without organized collection at this time.\par \par \par IMPRESSION:\par \par Status post third ventriculostomy since 3/13/23 and right frontal EVD; improved size of the lateral and third ventricles in this patient with aqueductal stenosis.\par \par --- End of Report ---

## 2023-03-21 NOTE — REVIEW OF SYSTEMS
[As Noted in HPI] : as noted in HPI [Fever] : no fever [Chills] : no chills [Confused or Disoriented] : no confusion [Facial Weakness] : no facial weakness [Arm Weakness] : no arm weakness [Hand Weakness] : no hand weakness [Leg Weakness] : no leg weakness [Numbness] : no numbness [Tingling] : no tingling [Seizures] : no convulsions [Dizziness] : no dizziness [Chest Pain] : no chest pain [Palpitations] : no palpitations [Shortness Of Breath] : no shortness of breath

## 2023-03-21 NOTE — REASON FOR VISIT
[Family Member] : family member [de-identified] : bifrontal craniotomy for endoscopic third ventriculostomy [de-identified] : 3/14/23

## 2023-03-21 NOTE — PHYSICAL EXAM
[General Appearance - Alert] : alert [General Appearance - In No Acute Distress] : in no acute distress [Clean] : clean [Dry] : dry [Healing Well] : healing well [Sutures Intact] : closed with intact sutures [No Drainage] : without drainage [Normal Skin] : normal [Oriented To Time, Place, And Person] : oriented to person, place, and time [Impaired Insight] : insight and judgment were intact [Affect] : the affect was normal [Memory Recent] : recent memory was not impaired [Cranial Nerves Optic (II)] : visual acuity intact bilaterally,  pupils equal round and reactive to light [Cranial Nerves Trigeminal (V)] : facial sensation intact symmetrically [Cranial Nerves Oculomotor (III)] : extraocular motion intact [Cranial Nerves Facial (VII)] : face symmetrical [Cranial Nerves Vestibulocochlear (VIII)] : hearing was intact bilaterally [Cranial Nerves Glossopharyngeal (IX)] : tongue and palate midline [Cranial Nerves Accessory (XI - Cranial And Spinal)] : head turning and shoulder shrug symmetric [Cranial Nerves Hypoglossal (XII)] : there was no tongue deviation with protrusion [Motor Tone] : muscle tone was normal in all four extremities [Motor Strength] : muscle strength was normal in all four extremities [Sclera] : the sclera and conjunctiva were normal [Neck Appearance] : the appearance of the neck was normal [] : no respiratory distress [Respiration, Rhythm And Depth] : normal respiratory rhythm and effort [Skin Color & Pigmentation] : normal skin color and pigmentation [Erythema] : not erythematous [Warm] : not warm

## 2023-03-21 NOTE — ASSESSMENT
[FreeTextEntry1] : 16 y/o female with obstructive hydrocephalus d/t aqueductal stenosis now s/p ETV 3/14/21. Uneventful hospital course, patient symptoms improving since than, post op MRI shows decrease in ventricular size and CSF flow study demonstrates good flow void between third ventricle and pre-pontine cistern.\par \par She is stable and healing appropriately.\par \par Instructed to: \par - Can leave incision open to air\par - Gently wash surgical sites daily with baby shampoo and water. Pat dry. \par - No swimming or soaking in bathtub for 6 weeks post-operatively or until wounds have fully healed. \par - Avoid applying cream/ointment directly to surgical incisions for 6 weeks post-op.\par - Continue monitoring for signs of infection including increased pain, redness, swelling, fever (temperature > 100.4 F), or yellow/green/brown drainage. \par - Please call immediately with any of these symptoms. During office hours, call your our office at [phone]. Call 9-1-1 for any life-threatening signs or symptoms. \par - Follow up with rest of medical team as advised.\par - Referral to Dr. Alexandro Burrell for neuro- opth evaluation\par - Follow-up in 2 weeks with Dr. Ross for post-op and suture removal\par - Will plan for MRI with CSF flow 2 months post op\par \par Patient verbalized understanding and agreement with treatment plan.\par \par I, Dr. Ross, personally performed the evaluation and management (E/M) services for this new patient. That E/M includes conducting the initial examination, assessing all conditions, and establishing the plan of care. Today, my ACP, Lyn Zimmerman, was here to observe my evaluation and management services for this patient to be followed going forward.\par \par Today, I personally spent 30 minutes in direct face to face time with the patient, of which greater than 50% of the time was spent in patient education and counseling as described above.\par

## 2023-03-21 NOTE — HISTORY OF PRESENT ILLNESS
[FreeTextEntry1] : 16 y/o female with PMHx of migraines, asthma, several years of syncopal episodes who presented to [facility] on 3/12/23 for a syncopal event. Patient stated she was in her kitchen getting ready for work and suddenly felt lightheaded and had cold sweats. She states it was an unwitnessed fall and landed on her face chipping her front tooth. She was brought to [place] where CTH demonstrated severe triventricular obstructive hydrocephalus. Dion was transferred to [facility] for further evaluation and management, including MRI and CSF flow study that demonstrated hydrocephalus d/t aqueductal stenosis and transependymal flow.\par She is now s/p ETV 3/14/21 and presents today for a post-operative evaluation and consideration of partial suture removal. \par \par She states she is doing well with no complaints. She endorses some dizziness today, but not prior to today. Denies headaches, balance issues, weakness of upper/lower extremities.  \par She reports adequate pain control. She denies fevers, chills, nausea, vomiting, bleeding, malaise, incisional dehiscence, swelling, scalp drainage, or other signs of infection.\par \par  \par \par

## 2023-03-29 DIAGNOSIS — G91.9 HYDROCEPHALUS, UNSPECIFIED: ICD-10-CM

## 2023-03-29 DIAGNOSIS — J45.909 UNSPECIFIED ASTHMA, UNCOMPLICATED: ICD-10-CM

## 2023-03-29 DIAGNOSIS — N83.201 UNSPECIFIED OVARIAN CYST, RIGHT SIDE: ICD-10-CM

## 2023-03-29 DIAGNOSIS — G91.1 OBSTRUCTIVE HYDROCEPHALUS: ICD-10-CM

## 2023-03-29 DIAGNOSIS — D50.9 IRON DEFICIENCY ANEMIA, UNSPECIFIED: ICD-10-CM

## 2023-03-29 DIAGNOSIS — G43.909 MIGRAINE, UNSPECIFIED, NOT INTRACTABLE, WITHOUT STATUS MIGRAINOSUS: ICD-10-CM

## 2023-04-03 ENCOUNTER — APPOINTMENT (OUTPATIENT)
Dept: NEUROSURGERY | Facility: CLINIC | Age: 18
End: 2023-04-03
Payer: MEDICAID

## 2023-04-03 VITALS
OXYGEN SATURATION: 97 % | HEART RATE: 50 BPM | HEIGHT: 61 IN | RESPIRATION RATE: 17 BRPM | TEMPERATURE: 97.5 F | DIASTOLIC BLOOD PRESSURE: 72 MMHG | BODY MASS INDEX: 23.79 KG/M2 | SYSTOLIC BLOOD PRESSURE: 104 MMHG | WEIGHT: 126 LBS

## 2023-04-03 PROCEDURE — 99024 POSTOP FOLLOW-UP VISIT: CPT

## 2023-04-06 NOTE — HISTORY OF PRESENT ILLNESS
[FreeTextEntry1] : 18 y/o female with PMHx of migraines, asthma, several years of syncopal episodes who presented to [facility] on 3/12/23 for a syncopal event. Patient stated she was in her kitchen getting ready for work and suddenly felt lightheaded and had cold sweats. She states it was an unwitnessed fall and landed on her face chipping her front tooth. She was brought to [place] where CTH demonstrated severe triventricular obstructive hydrocephalus. Dion was transferred to [facility] for further evaluation and management, including MRI and CSF flow study that demonstrated hydrocephalus d/t aqueductal stenosis and transependymal flow.\par She is now s/p ETV 3/14/21 and presents today for a post-operative evaluation and consideration of partial suture removal. \par \par She states she is doing well with no complaints. She endorses some dizziness today, but not prior to today. Denies headaches, balance issues, weakness of upper/lower extremities.  \par She reports adequate pain control. She denies fevers, chills, nausea, vomiting, bleeding, malaise, incisional dehiscence, swelling, scalp drainage, or other signs of infection.\par \par She comes in today for routine postop follow up for suture removal. \par She is scheduled to see Dr. Burrell 4/27. \par  \par \par

## 2023-04-06 NOTE — ASSESSMENT
[FreeTextEntry1] : 16 y/o female with obstructive hydrocephalus d/t aqueductal stenosis now s/p ETV 3/14/21. Uneventful hospital course, patient symptoms improving since than, post op MRI shows decrease in ventricular size and CSF flow study demonstrates good flow void between third ventricle and pre-pontine cistern.\par \par She is stable and healing appropriately.\par \par Instructed to: \par - Can leave incision open to air\par - Gently wash surgical sites daily with baby shampoo and water. Pat dry. \par - No swimming or soaking in bathtub for 6 weeks post-operatively or until wounds have fully healed. \par - Avoid applying cream/ointment directly to surgical incisions for 6 weeks post-op.\par - Continue monitoring for signs of infection including increased pain, redness, swelling, fever (temperature > 100.4 F), or yellow/green/brown drainage. \par - Please call immediately with any of these symptoms. During office hours, call your our office at [phone]. Call 9-1-1 for any life-threatening signs or symptoms. \par - Will plan for MRI with CSF flow 2 months post op\par \par Patient verbalized understanding and agreement with treatment plan.\par

## 2023-04-06 NOTE — REASON FOR VISIT
[Family Member] : family member [de-identified] : bifrontal craniotomy for endoscopic third ventriculostomy [de-identified] : 3/14/23

## 2023-04-26 ENCOUNTER — NON-APPOINTMENT (OUTPATIENT)
Age: 18
End: 2023-04-26

## 2023-04-27 ENCOUNTER — APPOINTMENT (OUTPATIENT)
Dept: OPHTHALMOLOGY | Facility: CLINIC | Age: 18
End: 2023-04-27
Payer: MEDICAID

## 2023-04-27 ENCOUNTER — NON-APPOINTMENT (OUTPATIENT)
Age: 18
End: 2023-04-27

## 2023-04-27 PROCEDURE — 92004 COMPRE OPH EXAM NEW PT 1/>: CPT

## 2023-04-27 PROCEDURE — 92133 CPTRZD OPH DX IMG PST SGM ON: CPT

## 2023-04-27 PROCEDURE — 92083 EXTENDED VISUAL FIELD XM: CPT

## 2023-04-28 ENCOUNTER — APPOINTMENT (OUTPATIENT)
Dept: MRI IMAGING | Facility: HOSPITAL | Age: 18
End: 2023-04-28
Payer: MEDICAID

## 2023-04-28 ENCOUNTER — OUTPATIENT (OUTPATIENT)
Dept: OUTPATIENT SERVICES | Facility: HOSPITAL | Age: 18
LOS: 1 days | End: 2023-04-28
Payer: COMMERCIAL

## 2023-04-28 PROCEDURE — 70551 MRI BRAIN STEM W/O DYE: CPT

## 2023-04-28 PROCEDURE — 70551 MRI BRAIN STEM W/O DYE: CPT | Mod: 26

## 2023-05-02 ENCOUNTER — APPOINTMENT (OUTPATIENT)
Dept: NEUROSURGERY | Facility: CLINIC | Age: 18
End: 2023-05-02
Payer: MEDICAID

## 2023-05-02 ENCOUNTER — NON-APPOINTMENT (OUTPATIENT)
Age: 18
End: 2023-05-02

## 2023-05-02 VITALS
HEIGHT: 61 IN | RESPIRATION RATE: 18 BRPM | HEART RATE: 78 BPM | WEIGHT: 126 LBS | OXYGEN SATURATION: 98 % | TEMPERATURE: 97 F | BODY MASS INDEX: 23.79 KG/M2 | DIASTOLIC BLOOD PRESSURE: 66 MMHG | SYSTOLIC BLOOD PRESSURE: 100 MMHG

## 2023-05-02 PROCEDURE — 99024 POSTOP FOLLOW-UP VISIT: CPT

## 2023-05-08 NOTE — REASON FOR VISIT
[de-identified] : bifrontal craniotomy for endoscopic third ventriculostomy [de-identified] : 3/14/23

## 2023-05-08 NOTE — HISTORY OF PRESENT ILLNESS
[FreeTextEntry1] : 18 y/o female with PMHx of migraines, asthma, several years of syncopal episodes who presented to [facility] on 3/12/23 for a syncopal event. Patient stated she was in her kitchen getting ready for work and suddenly felt lightheaded and had cold sweats. She states it was an unwitnessed fall and landed on her face chipping her front tooth. She was brought to [place] where CTH demonstrated severe triventricular obstructive hydrocephalus. Dion was transferred to [facility] for further evaluation and management, including MRI and CSF flow study that demonstrated hydrocephalus d/t aqueductal stenosis and transependymal flow.\par She is now s/p ETV 3/14/23.\par \par She states she is doing well with no complaints.Denies headaches, balance issues, weakness of upper/lower extremities.  \par She reports adequate pain control. She denies fevers, chills, nausea, vomiting, bleeding, malaise, incisional dehiscence, swelling, scalp drainage, or other signs of infection.\par \par She comes in today for routine postop follow up with an MRI with CSF flow study for review. \par She is recovering well and has no neurologic complaints at this time. She is finishing her semester next week and plans to travel to [place] this summer. Dion is happy her migraine headaches have been completely resolved and her vision is getting better.  Wounds are completely healed.\par New MRI from 4/28/2023 demonstrates decreased ventricular size and patent flow through the floor of third ventricle in CINE study.\par \par Patient is also followed with Dr. Garcia for Papilledema. \par

## 2023-05-08 NOTE — ASSESSMENT
[FreeTextEntry1] : 16 y/o female with obstructive hydrocephalus d/t aqueductal stenosis now s/p ETV 3/14/21. Uneventful hospital course, patient symptoms improving since than, post op MRI shows decrease in ventricular size and CSF flow study demonstrates good flow void between third ventricle and pre-pontine cistern.\par \par She is stable and healing appropriately.\par \par Instructed to: \par - Repeat MRI in 6 months and RTC\par - Please let us know if symptoms such as severe headaches, vausea vomiting vision change.\par -Continue follow Neuro-ophtalmoloy Dr. Burrell as advised.\par - Please call immediately with any of these symptoms. During office hours, call your our office at [phone]. Call 9-1-1 for any life-threatening signs or symptoms. \par \par \par Patient verbalized understanding and agreement with treatment plan.\par

## 2023-05-26 ENCOUNTER — APPOINTMENT (OUTPATIENT)
Dept: INTERNAL MEDICINE | Facility: CLINIC | Age: 18
End: 2023-05-26
Payer: MEDICAID

## 2023-05-26 ENCOUNTER — LABORATORY RESULT (OUTPATIENT)
Age: 18
End: 2023-05-26

## 2023-05-26 VITALS
HEIGHT: 61 IN | DIASTOLIC BLOOD PRESSURE: 72 MMHG | OXYGEN SATURATION: 98 % | TEMPERATURE: 97.8 F | RESPIRATION RATE: 18 BRPM | WEIGHT: 124 LBS | BODY MASS INDEX: 23.41 KG/M2 | SYSTOLIC BLOOD PRESSURE: 109 MMHG | HEART RATE: 77 BPM

## 2023-05-26 DIAGNOSIS — R42 DIZZINESS AND GIDDINESS: ICD-10-CM

## 2023-05-26 DIAGNOSIS — R55 SYNCOPE AND COLLAPSE: ICD-10-CM

## 2023-05-26 DIAGNOSIS — G91.9 HYDROCEPHALUS, UNSPECIFIED: ICD-10-CM

## 2023-05-26 DIAGNOSIS — Z91.018 ALLERGY TO OTHER FOODS: ICD-10-CM

## 2023-05-26 DIAGNOSIS — Z51.89 ENCOUNTER FOR OTHER SPECIFIED AFTERCARE: ICD-10-CM

## 2023-05-26 DIAGNOSIS — Z09 ENCOUNTER FOR FOLLOW-UP EXAMINATION AFTER COMPLETED TREATMENT FOR CONDITIONS OTHER THAN MALIGNANT NEOPLASM: ICD-10-CM

## 2023-05-26 DIAGNOSIS — J45.909 UNSPECIFIED ASTHMA, UNCOMPLICATED: ICD-10-CM

## 2023-05-26 DIAGNOSIS — Z00.00 ENCOUNTER FOR GENERAL ADULT MEDICAL EXAMINATION W/OUT ABNORMAL FINDINGS: ICD-10-CM

## 2023-05-26 DIAGNOSIS — N92.0 EXCESSIVE AND FREQUENT MENSTRUATION WITH REGULAR CYCLE: ICD-10-CM

## 2023-05-26 DIAGNOSIS — H47.10 UNSPECIFIED PAPILLEDEMA: ICD-10-CM

## 2023-05-26 DIAGNOSIS — D50.0 IRON DEFICIENCY ANEMIA SECONDARY TO BLOOD LOSS (CHRONIC): ICD-10-CM

## 2023-05-26 PROCEDURE — 99385 PREV VISIT NEW AGE 18-39: CPT

## 2023-05-26 RX ORDER — LORATADINE 10 MG
17 TABLET,DISINTEGRATING ORAL
Refills: 0 | Status: DISCONTINUED | COMMUNITY
End: 2023-05-26

## 2023-05-26 RX ORDER — ALBUTEROL 90 MCG
AEROSOL (GRAM) INHALATION
Refills: 0 | Status: ACTIVE | COMMUNITY

## 2023-05-26 RX ORDER — TOPIRAMATE 200 MG/1
200 TABLET, COATED ORAL
Refills: 0 | Status: DISCONTINUED | COMMUNITY
End: 2023-05-26

## 2023-05-26 RX ORDER — EPINEPHRINE 0.3 MG/.3ML
0.3 INJECTION INTRAMUSCULAR
Qty: 1 | Refills: 1 | Status: ACTIVE | COMMUNITY
Start: 2023-05-26 | End: 1900-01-01

## 2023-05-26 RX ORDER — IRON/IRON ASP GLY/FA/MV-MIN 38 125-25-1MG
TABLET ORAL
Refills: 0 | Status: ACTIVE | COMMUNITY

## 2023-05-26 RX ORDER — OXYCODONE 5 MG/1
5 TABLET ORAL
Refills: 0 | Status: DISCONTINUED | COMMUNITY
End: 2023-05-26

## 2023-05-29 DIAGNOSIS — R82.90 UNSPECIFIED ABNORMAL FINDINGS IN URINE: ICD-10-CM

## 2023-05-29 DIAGNOSIS — E03.8 OTHER SPECIFIED HYPOTHYROIDISM: ICD-10-CM

## 2023-05-29 LAB
25(OH)D3 SERPL-MCNC: 25.4 NG/ML
ALBUMIN SERPL ELPH-MCNC: 4.7 G/DL
ALP BLD-CCNC: 100 U/L
ALT SERPL-CCNC: 13 U/L
ANION GAP SERPL CALC-SCNC: 14 MMOL/L
APPEARANCE: CLEAR
AST SERPL-CCNC: 19 U/L
BILIRUB SERPL-MCNC: 0.5 MG/DL
BILIRUBIN URINE: NEGATIVE
BLOOD URINE: ABNORMAL
BUN SERPL-MCNC: 11 MG/DL
CALCIUM SERPL-MCNC: 10.4 MG/DL
CHLORIDE SERPL-SCNC: 104 MMOL/L
CO2 SERPL-SCNC: 22 MMOL/L
COLOR: YELLOW
CREAT SERPL-MCNC: 0.69 MG/DL
EGFR: 128 ML/MIN/1.73M2
FERRITIN SERPL-MCNC: 35 NG/ML
FOLATE SERPL-MCNC: 7.7 NG/ML
GLUCOSE QUALITATIVE U: NEGATIVE MG/DL
GLUCOSE SERPL-MCNC: 87 MG/DL
IRON SATN MFR SERPL: 21 %
IRON SERPL-MCNC: 68 UG/DL
KETONES URINE: NEGATIVE MG/DL
LEUKOCYTE ESTERASE URINE: ABNORMAL
NITRITE URINE: NEGATIVE
PH URINE: 6.5
POTASSIUM SERPL-SCNC: 4.9 MMOL/L
PROT SERPL-MCNC: 7.7 G/DL
PROTEIN URINE: NORMAL MG/DL
SODIUM SERPL-SCNC: 140 MMOL/L
SPECIFIC GRAVITY URINE: 1.02
TIBC SERPL-MCNC: 326 UG/DL
TSH SERPL-ACNC: 5.79 UIU/ML
UIBC SERPL-MCNC: 258 UG/DL
UROBILINOGEN URINE: 0.2 MG/DL
VIT B12 SERPL-MCNC: 683 PG/ML

## 2023-06-01 ENCOUNTER — TRANSCRIPTION ENCOUNTER (OUTPATIENT)
Age: 18
End: 2023-06-01

## 2023-06-08 ENCOUNTER — NON-APPOINTMENT (OUTPATIENT)
Age: 18
End: 2023-06-08

## 2023-06-08 ENCOUNTER — APPOINTMENT (OUTPATIENT)
Dept: OPHTHALMOLOGY | Facility: CLINIC | Age: 18
End: 2023-06-08
Payer: MEDICAID

## 2023-06-08 PROCEDURE — 92014 COMPRE OPH EXAM EST PT 1/>: CPT

## 2023-06-08 PROCEDURE — 92133 CPTRZD OPH DX IMG PST SGM ON: CPT

## 2023-06-08 PROCEDURE — 92083 EXTENDED VISUAL FIELD XM: CPT

## 2023-06-09 ENCOUNTER — TRANSCRIPTION ENCOUNTER (OUTPATIENT)
Age: 18
End: 2023-06-09

## 2023-06-13 ENCOUNTER — APPOINTMENT (OUTPATIENT)
Dept: CARDIOLOGY | Facility: CLINIC | Age: 18
End: 2023-06-13

## 2023-06-27 ENCOUNTER — APPOINTMENT (OUTPATIENT)
Dept: CARDIOLOGY | Facility: CLINIC | Age: 18
End: 2023-06-27

## 2023-06-27 NOTE — REASON FOR VISIT
[FreeTextEntry1] : Ms. CLAIRE RICHARDS 18 year old F is here Jun 21, 2023 to establish care in the [facility] program.

## 2023-08-11 NOTE — ASSESSMENT
[FreeTextEntry1] : 18 y/o female with obstructive hydrocephalus d/t aqueductal stenosis now s/p ETV 3/14/21. Uneventful hospital course, patient symptoms improving since than, post op MRI shows decrease in ventricular size and CSF flow study demonstrates good flow void between third ventricle and pre-pontine cistern.\par  \par  She is stable and healing appropriately.\par  \par  Instructed to: \par  - Repeat MRI in 6 months and RTC\par  - Please let us know if symptoms such as severe headaches, vausea vomiting vision change.\par  -Continue follow Neuro-ophtalmoloy Dr. Burrell as advised.\par  - Please call immediately with any of these symptoms. During office hours, call your our office at [phone]. Call 9-1-1 for any life-threatening signs or symptoms. \par  \par  \par  Patient verbalized understanding and agreement with treatment plan.\par

## 2023-08-11 NOTE — REASON FOR VISIT
[Post Op: _________] : a [unfilled] post op visit [de-identified] : bifrontal craniotomy for endoscopic third ventriculostomy [de-identified] : 3/14/23 [Family Member] : family member

## 2023-08-11 NOTE — HISTORY OF PRESENT ILLNESS
[FreeTextEntry1] : 16 y/o female with PMHx of migraines, asthma, several years of syncopal episodes who presented to [facility] on 3/12/23 for a syncopal event. Patient stated she was in her kitchen getting ready for work and suddenly felt lightheaded and had cold sweats. She states it was an unwitnessed fall and landed on her face chipping her front tooth. She was brought to [place] where CTH demonstrated severe triventricular obstructive hydrocephalus. Dion was transferred to [facility] for further evaluation and management, including MRI and CSF flow study that demonstrated hydrocephalus d/t aqueductal stenosis and transependymal flow.\par  She is now s/p ETV 3/14/23.\par  \par  She states she is doing well with no complaints.Denies headaches, balance issues, weakness of upper/lower extremities.  \par  She reports adequate pain control. She denies fevers, chills, nausea, vomiting, bleeding, malaise, incisional dehiscence, swelling, scalp drainage, or other signs of infection.\par  \par  She comes in today for routine postop follow up with an MRI with CSF flow study for review. \par  She is recovering well and has no neurologic complaints at this time. She is finishing her semester next week and plans to travel to [place] this summer. Dion is happy her migraine headaches have been completely resolved and her vision is getting better.  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 \par

## 2023-08-15 ENCOUNTER — APPOINTMENT (OUTPATIENT)
Dept: NEUROSURGERY | Facility: CLINIC | Age: 18
End: 2023-08-15

## 2023-10-17 ENCOUNTER — APPOINTMENT (OUTPATIENT)
Dept: NEUROSURGERY | Facility: CLINIC | Age: 18
End: 2023-10-17

## 2023-10-31 ENCOUNTER — APPOINTMENT (OUTPATIENT)
Dept: NEUROSURGERY | Facility: CLINIC | Age: 18
End: 2023-10-31

## 2023-11-01 ENCOUNTER — OUTPATIENT (OUTPATIENT)
Dept: OUTPATIENT SERVICES | Facility: HOSPITAL | Age: 18
LOS: 1 days | End: 2023-11-01
Payer: COMMERCIAL

## 2023-11-01 ENCOUNTER — APPOINTMENT (OUTPATIENT)
Dept: MRI IMAGING | Facility: HOSPITAL | Age: 18
End: 2023-11-01

## 2023-11-01 PROCEDURE — 70551 MRI BRAIN STEM W/O DYE: CPT | Mod: 26

## 2023-11-01 PROCEDURE — 70551 MRI BRAIN STEM W/O DYE: CPT

## 2023-11-07 ENCOUNTER — APPOINTMENT (OUTPATIENT)
Dept: NEUROSURGERY | Facility: CLINIC | Age: 18
End: 2023-11-07
Payer: MEDICAID

## 2023-11-07 VITALS
TEMPERATURE: 97.2 F | DIASTOLIC BLOOD PRESSURE: 71 MMHG | SYSTOLIC BLOOD PRESSURE: 105 MMHG | WEIGHT: 130 LBS | OXYGEN SATURATION: 97 % | HEIGHT: 61 IN | RESPIRATION RATE: 18 BRPM | BODY MASS INDEX: 24.55 KG/M2 | HEART RATE: 85 BPM

## 2023-11-07 PROCEDURE — 99024 POSTOP FOLLOW-UP VISIT: CPT

## 2023-12-07 ENCOUNTER — APPOINTMENT (OUTPATIENT)
Dept: OPHTHALMOLOGY | Facility: CLINIC | Age: 18
End: 2023-12-07
Payer: MEDICAID

## 2023-12-07 ENCOUNTER — NON-APPOINTMENT (OUTPATIENT)
Age: 18
End: 2023-12-07

## 2023-12-07 PROCEDURE — 92083 EXTENDED VISUAL FIELD XM: CPT

## 2023-12-07 PROCEDURE — 92133 CPTRZD OPH DX IMG PST SGM ON: CPT

## 2023-12-07 PROCEDURE — 92014 COMPRE OPH EXAM EST PT 1/>: CPT

## 2024-03-06 NOTE — CHART NOTE - NSCHARTNOTEFT_GEN_A_CORE
"Subjective   Onel Zapata is a 20 y.o. female.     History of Present Illness   Pt presents to establish care  F/u for abnormal thyroid labs from GYN. Elevated TSH with normal T4.   Recent miscarriage. Does plan to start on OCP. Has been sent in by GYN.     Not currently being treated for hypothyroidism   Patient diagnosed with Grave's disease and Hashimoto's thyroiditis around age 6/7. Had treatment to thyroid that made her underactive. Unsure what therapy she had done.   Had been on high dose of levothyroxine, but has been off medication for about 2 years. Had also been on brand synthroid when younger. Was off an on treatment when younger.   Notes medication made her feel fatigued/ lethargic. Felt better off of medication   Now, off of medication, noticing a lot of hair shedding in the shower. Struggles with constipation. Has tried probiotic in the past with mild relief.   Hx of gallbladder surgery       Hx of hypertension. Was on medication when she was around 16 or 17   Notes she was on two different medications at one point   Also weighed close to 300 lbs at one point. No longer snacking as much and is more active.       The following portions of the patient's history were reviewed and updated as appropriate: allergies, current medications, past family history, past medical history, past social history, past surgical history, and problem list.    Review of Systems  As noted per HPI     Objective   Blood pressure 112/66, pulse 90, temperature 98 °F (36.7 °C), resp. rate 18, height 170.2 cm (67\"), weight 102 kg (225 lb 3.2 oz), SpO2 100%.     Physical Exam  Constitutional:       Appearance: Normal appearance.   Neck:      Thyroid: Thyromegaly present.   Cardiovascular:      Rate and Rhythm: Normal rate and regular rhythm.   Pulmonary:      Effort: Pulmonary effort is normal.      Breath sounds: Normal breath sounds.   Skin:     General: Skin is warm and dry.   Neurological:      Mental Status: She is alert and " MUNA overnight. ISS dc'd. s/p lovenox 40 mg x 1. Ativan 1.5 mcg given for MRI CSF flow study and MRV done. ?Aqueductal stenosis pending final read. Possible plan for ETV, to be discussed with patient and family. oriented to person, place, and time.   Psychiatric:         Mood and Affect: Mood normal.         Behavior: Behavior normal.         Assessment & Plan   Diagnoses and all orders for this visit:    1. Hypothyroidism due to Hashimoto's thyroiditis (Primary)  -     TSH  -     T4, free  -     T3, free  -     Thyroid Antibodies  -     CBC w AUTO Differential  -     Comprehensive metabolic panel    2. Vitamin D deficiency  -     Vitamin D,25-Hydroxy    3. Chronic fatigue  -     Iron Profile  -     Vitamin B12  -     Folate    4. Thyromegaly  -     US Thyroid    5. Miscarriage  -     HCG, B-subunit, Quantitative    Other orders  -     HCG, B-subunit, Quantitative    Labs as outlined in plan   Thyroid US ordered     Consider NP thyroid due to side effects with levothyroxine and synthroid

## 2024-06-07 ENCOUNTER — NON-APPOINTMENT (OUTPATIENT)
Age: 19
End: 2024-06-07

## 2024-06-07 ENCOUNTER — APPOINTMENT (OUTPATIENT)
Dept: OPHTHALMOLOGY | Facility: CLINIC | Age: 19
End: 2024-06-07
Payer: MEDICAID

## 2024-06-07 PROCEDURE — 92083 EXTENDED VISUAL FIELD XM: CPT

## 2024-06-07 PROCEDURE — 92133 CPTRZD OPH DX IMG PST SGM ON: CPT

## 2024-06-07 PROCEDURE — 92014 COMPRE OPH EXAM EST PT 1/>: CPT

## 2024-11-30 ENCOUNTER — OUTPATIENT (OUTPATIENT)
Dept: OUTPATIENT SERVICES | Facility: HOSPITAL | Age: 19
LOS: 1 days | End: 2024-11-30
Payer: MEDICAID

## 2024-11-30 ENCOUNTER — APPOINTMENT (OUTPATIENT)
Dept: MRI IMAGING | Facility: HOSPITAL | Age: 19
End: 2024-11-30

## 2024-11-30 ENCOUNTER — RESULT REVIEW (OUTPATIENT)
Age: 19
End: 2024-11-30

## 2024-11-30 PROCEDURE — 70551 MRI BRAIN STEM W/O DYE: CPT | Mod: 26

## 2024-11-30 PROCEDURE — 70551 MRI BRAIN STEM W/O DYE: CPT

## 2024-12-13 ENCOUNTER — NON-APPOINTMENT (OUTPATIENT)
Age: 19
End: 2024-12-13

## 2024-12-13 ENCOUNTER — APPOINTMENT (OUTPATIENT)
Dept: OPHTHALMOLOGY | Facility: CLINIC | Age: 19
End: 2024-12-13
Payer: MEDICAID

## 2024-12-13 PROCEDURE — 92083 EXTENDED VISUAL FIELD XM: CPT

## 2024-12-13 PROCEDURE — 92133 CPTRZD OPH DX IMG PST SGM ON: CPT

## 2024-12-13 PROCEDURE — 92014 COMPRE OPH EXAM EST PT 1/>: CPT

## 2024-12-17 ENCOUNTER — APPOINTMENT (OUTPATIENT)
Dept: NEUROSURGERY | Facility: CLINIC | Age: 19
End: 2024-12-17

## 2024-12-17 VITALS
DIASTOLIC BLOOD PRESSURE: 77 MMHG | BODY MASS INDEX: 22.66 KG/M2 | RESPIRATION RATE: 18 BRPM | WEIGHT: 120 LBS | HEIGHT: 61 IN | HEART RATE: 75 BPM | OXYGEN SATURATION: 98 % | TEMPERATURE: 97.9 F | SYSTOLIC BLOOD PRESSURE: 102 MMHG

## 2024-12-17 DIAGNOSIS — G91.9 HYDROCEPHALUS, UNSPECIFIED: ICD-10-CM

## 2024-12-17 PROCEDURE — 99213 OFFICE O/P EST LOW 20 MIN: CPT

## (undated) DEVICE — PREP CHLORAPREP HI-LITE ORANGE 26ML

## (undated) DEVICE — Device

## (undated) DEVICE — MIDAS REX MR8 BALL FLUTED LG BORE 5MM X 9CM

## (undated) DEVICE — PACK CRANIOTOMY LNX SURGICOUNT

## (undated) DEVICE — STRYKER INSTRUMENT BATTERY

## (undated) DEVICE — STAPLER SKIN PROXIMATE

## (undated) DEVICE — SUT NUROLON 4-0 8-18" TF (POP-OFF)

## (undated) DEVICE — DRAPE PROBE COVER 5" X 96"

## (undated) DEVICE — ACCUDRAIN EXTERNAL CSF

## (undated) DEVICE — TUBING SMOKE EVAC 3/8" X 10FT FOR NEPTUNE

## (undated) DEVICE — CABLE MONOPOLAR HIGH FREQUENCY

## (undated) DEVICE — CRANIAL MASK TRACKER

## (undated) DEVICE — SUT SILK 2-0 30" PSL

## (undated) DEVICE — SPONGE SURGICAL STRIP 1/2 X 6"

## (undated) DEVICE — FOLEY TRAY 16FR 5CC LF UMETER CLOSED

## (undated) DEVICE — BIPOLAR FORCEP KOGENT IRRIGATING STRAIGHT 0.5MM X 7" DISP

## (undated) DEVICE — PUMP CANNISTER ARTEMIS NON STERILE

## (undated) DEVICE — BUR NSK CARBIDE SMALL 5MM

## (undated) DEVICE — SUT ETHILON 3-0 18" PS-1

## (undated) DEVICE — ELCTR BIPOLAR SEMI RIGID COAG 1.3MM X 30CM STERILE

## (undated) DEVICE — AESCULAP INTRODUCER 19FR FOR MINOP TROCAR

## (undated) DEVICE — PRESSURE INFUSOR BAG 1000ML

## (undated) DEVICE — SPONGE SURGICAL STRIP 1/4 X 6"

## (undated) DEVICE — MIDAS REX LEGEND TAPERED SM BORE 2.3MM X 8CM

## (undated) DEVICE — MIDAS REX MR8 BALL FLUTED LG BORE 4MM X 9CM

## (undated) DEVICE — STOPCOCK 3 WAY W SWIVEL MALE LUER LOCK

## (undated) DEVICE — SUT VICRYL PLUS 2-0 18" CT-2 (POP-OFF)

## (undated) DEVICE — PREP BETADINE SPONGE STICKS

## (undated) DEVICE — CODMAN PERFORATOR 14MM (BLUE)

## (undated) DEVICE — AESCULAP SCALPFIX 10 CLIPS

## (undated) DEVICE — TUBING SUCTION 20FT

## (undated) DEVICE — SUT SILK 2-0 12-18"

## (undated) DEVICE — LONE STAR RETRACTOR RING 12MM BLUNT DISP

## (undated) DEVICE — SUT VICRYL 0 18" CT-1 UNDYED (POP-OFF)